# Patient Record
Sex: FEMALE | Race: WHITE | NOT HISPANIC OR LATINO | Employment: UNEMPLOYED | URBAN - METROPOLITAN AREA
[De-identification: names, ages, dates, MRNs, and addresses within clinical notes are randomized per-mention and may not be internally consistent; named-entity substitution may affect disease eponyms.]

---

## 2018-05-14 ENCOUNTER — OFFICE VISIT (OUTPATIENT)
Dept: OBSTETRICS AND GYNECOLOGY | Facility: CLINIC | Age: 33
End: 2018-05-14
Payer: COMMERCIAL

## 2018-05-14 VITALS
SYSTOLIC BLOOD PRESSURE: 124 MMHG | BODY MASS INDEX: 24.69 KG/M2 | DIASTOLIC BLOOD PRESSURE: 84 MMHG | WEIGHT: 134.19 LBS | HEIGHT: 62 IN

## 2018-05-14 DIAGNOSIS — N89.8 VAGINAL DISCHARGE: ICD-10-CM

## 2018-05-14 DIAGNOSIS — Z32.01 POSITIVE URINE PREGNANCY TEST: Primary | ICD-10-CM

## 2018-05-14 PROCEDURE — 80307 DRUG TEST PRSMV CHEM ANLYZR: CPT

## 2018-05-14 PROCEDURE — 99999 PR PBB SHADOW E&M-EST. PATIENT-LVL III: CPT | Mod: PBBFAC,,, | Performed by: OBSTETRICS & GYNECOLOGY

## 2018-05-14 PROCEDURE — 87480 CANDIDA DNA DIR PROBE: CPT

## 2018-05-14 PROCEDURE — 87086 URINE CULTURE/COLONY COUNT: CPT

## 2018-05-14 PROCEDURE — 88175 CYTOPATH C/V AUTO FLUID REDO: CPT

## 2018-05-14 PROCEDURE — 87491 CHLMYD TRACH DNA AMP PROBE: CPT

## 2018-05-14 PROCEDURE — 99395 PREV VISIT EST AGE 18-39: CPT | Mod: S$GLB,,, | Performed by: OBSTETRICS & GYNECOLOGY

## 2018-05-14 PROCEDURE — 87510 GARDNER VAG DNA DIR PROBE: CPT

## 2018-05-14 NOTE — PROGRESS NOTES
"CC: Annual check-up    SUBJECTIVE:   33 y.o. female   for annual routine Pap and checkup. Patient's last menstrual period was 2018 (exact date)..EGA is 4 5/7 wks and edc 19  She has no unusual complaints.     IS 6' 9" AND WAS A 10 + LD BABY    History reviewed. No pertinent past medical history.  History reviewed. No pertinent surgical history.  Social History     Social History    Marital status:      Spouse name: N/A    Number of children: N/A    Years of education: N/A     Occupational History    Not on file.     Social History Main Topics    Smoking status: Never Smoker    Smokeless tobacco: Never Used    Alcohol use No    Drug use: No    Sexual activity: Yes     Partners: Male     Birth control/ protection: None     Other Topics Concern    Not on file     Social History Narrative    No narrative on file     Family History   Problem Relation Age of Onset    Breast cancer Paternal Aunt      OB History    Para Term  AB Living   1 0 0 0 0 0   SAB TAB Ectopic Multiple Live Births   0 0 0 0        # Outcome Date GA Lbr Asif/2nd Weight Sex Delivery Anes PTL Lv   1 Current                     No current outpatient prescriptions on file.     No current facility-administered medications for this visit.      Allergies: Shellfish containing products and Iodine and iodide containing products     ROS:  Constitutional: no weight loss, weight gain, fever, fatigue  Eyes:  No vision changes, glasses/contacts  ENT/Mouth: No ulcers, sinus problems, ears ringing, headache  Cardiovascular: No inability to lie flat, chest pain, exercise intolerance, swelling, heart palpitations  Respiratory: No wheezing, coughing blood, shortness of breath, or cough  Gastrointestinal: No diarrhea, bloody stool, nausea/vomiting, constipation, gas, hemorrhoids  Genitourinary: No blood in urine, painful urination, urgency of urination, frequency of urination, incomplete emptying, incontinence, " "abnormal bleeding, painful periods, heavy periods, vaginal discharge, vaginal odor, painful intercourse, sexual problems, bleeding after intercourse.  Musculoskeletal: No muscle weakness  Skin/Breast: No painful breasts, nipple discharge, masses, rash, ulcers  Neurological: No passing out, seizures, numbness, headache  Endocrine: No diabetes, hypothyroid, hyperthyroid, hot flashes, hair loss, abnormal hair growth, ance  Psychiatric: No depression, crying  Hematologic: No bruises, bleeding, swollen lymph nodes, anemia.      OBJECTIVE:   The patient appears well, alert, oriented x 3, in no distress.  /84   Ht 5' 2" (1.575 m)   Wt 60.9 kg (134 lb 3.2 oz)   LMP 04/11/2018 (Exact Date)   BMI 24.55 kg/m²   NECK: no thyromegaly, trachea midline  SKIN: no acne, striae, hirsutism  BREAST EXAM: not examined  ABDOMEN: no hernias, masses, or hepatosplenomegaly  GENITALIA: normal external genitalia, no erythema, no discharge  URETHRA: normal urethra, normal urethral meatus  VAGINA: vaginal discharge scant and white  CERVIX: no lesions or cervical motion tenderness  UTERUS: enlarged, 4-5 weeks size, CONTRACTED PELVIS  ADNEXA: normal adnexa      ASSESSMENT:   well woman  1. Positive urine pregnancy test    2. Vaginal discharge        PLAN:   pap smear  return after u/s  Orders Placed This Encounter    C. trachomatis/N. gonorrhoeae by AMP DNA Cervix    Urine culture    Vaginosis Screen by DNA Probe    Drug screen panel, emergency    Liquid-based pap smear, screening     "

## 2018-05-14 NOTE — PROGRESS NOTES
"CC: Annual check-up, upt +  SUBJECTIVE:   33 y.o. female   for annual routine Pap and checkup. Patient's last menstrual period was 2018 (exact date)..  She {gen pregnancy complaintsobgyn:495525::"has no unusual complaints"}.        History reviewed. No pertinent past medical history.  History reviewed. No pertinent surgical history.  Social History     Social History    Marital status:      Spouse name: N/A    Number of children: N/A    Years of education: N/A     Occupational History    Not on file.     Social History Main Topics    Smoking status: Never Smoker    Smokeless tobacco: Never Used    Alcohol use No    Drug use: No    Sexual activity: Yes     Partners: Male     Birth control/ protection: None     Other Topics Concern    Not on file     Social History Narrative    No narrative on file     Family History   Problem Relation Age of Onset    Breast cancer Paternal Aunt      OB History    Para Term  AB Living   1 0 0 0 0 0   SAB TAB Ectopic Multiple Live Births   0 0 0 0        # Outcome Date GA Lbr Asif/2nd Weight Sex Delivery Anes PTL Lv   1 Current                     No current outpatient prescriptions on file.     No current facility-administered medications for this visit.      Allergies: Shellfish containing products and Iodine and iodide containing products     ROS:  Constitutional: no weight loss, weight gain, fever, fatigue  Eyes:  No vision changes, glasses/contacts  ENT/Mouth: No ulcers, sinus problems, ears ringing, headache  Cardiovascular: No inability to lie flat, chest pain, exercise intolerance, swelling, heart palpitations  Respiratory: No wheezing, coughing blood, shortness of breath, or cough  Gastrointestinal: No diarrhea, bloody stool, nausea/vomiting, constipation, gas, hemorrhoids  Genitourinary: No blood in urine, painful urination, urgency of urination, frequency of urination, incomplete emptying, incontinence, abnormal bleeding, painful " "periods, heavy periods, vaginal discharge, vaginal odor, painful intercourse, sexual problems, bleeding after intercourse.  Musculoskeletal: No muscle weakness  Skin/Breast: No painful breasts, nipple discharge, masses, rash, ulcers  Neurological: No passing out, seizures, numbness, headache  Endocrine: No diabetes, hypothyroid, hyperthyroid, hot flashes, hair loss, abnormal hair growth, ance  Psychiatric: No depression, crying  Hematologic: No bruises, bleeding, swollen lymph nodes, anemia.      OBJECTIVE:   The patient appears well, alert, oriented x 3, in no distress.  /84   Ht 5' 2" (1.575 m)   Wt 60.9 kg (134 lb 3.2 oz)   LMP 04/11/2018 (Exact Date)   BMI 24.55 kg/m²   NECK: {NECK EXAM:85235::"no thyromegaly, trachea midline"}  SKIN: {Skin exam (ob):32709::"no acne, striae, hirsutism"}  BREAST EXAM: {pe breast exam:387208::"breasts appear normal, no suspicious masses, no skin or nipple changes or axillary nodes"}  ABDOMEN: {Exam; abdomen:11553::"no hernias, masses, or hepatosplenomegaly"}  GENITALIA: {Exam; genitalia female:07554::"normal external genitalia, no erythema, no discharge"}  URETHRA: {urethra:028869::"normal urethra, normal urethral meatus"}  VAGINA: {Exam; female vaginal exam:35835}  CERVIX: {pe cervix ob comprehensive:525870::"no lesions or cervical motion tenderness"}  UTERUS: {exam; uterus:92383}  ADNEXA: {exam; adnexa:08977}      ASSESSMENT:   {gyn assessment:915570::"well woman"}  No diagnosis found.    PLAN:   {gyn plan:478486::"mammogram","pap smear","return annually or prn"}     "

## 2018-05-15 LAB
AMPHET+METHAMPHET UR QL: NEGATIVE
BARBITURATES UR QL SCN>200 NG/ML: NEGATIVE
BENZODIAZ UR QL SCN>200 NG/ML: NEGATIVE
BZE UR QL SCN: NEGATIVE
C TRACH DNA SPEC QL NAA+PROBE: NOT DETECTED
CANDIDA RRNA VAG QL PROBE: NEGATIVE
CANNABINOIDS UR QL SCN: NEGATIVE
CREAT UR-MCNC: 15 MG/DL
G VAGINALIS RRNA GENITAL QL PROBE: NEGATIVE
METHADONE UR QL SCN>300 NG/ML: NEGATIVE
N GONORRHOEA DNA SPEC QL NAA+PROBE: NOT DETECTED
OPIATES UR QL SCN: NEGATIVE
PCP UR QL SCN>25 NG/ML: NEGATIVE
T VAGINALIS RRNA GENITAL QL PROBE: NEGATIVE
TOXICOLOGY INFORMATION: NORMAL

## 2018-05-16 LAB — BACTERIA UR CULT: NO GROWTH

## 2018-05-28 ENCOUNTER — HOSPITAL ENCOUNTER (OUTPATIENT)
Dept: RADIOLOGY | Facility: HOSPITAL | Age: 33
Discharge: HOME | End: 2018-05-28
Attending: OBSTETRICS & GYNECOLOGY
Payer: COMMERCIAL

## 2018-05-28 ENCOUNTER — ROUTINE PRENATAL (OUTPATIENT)
Dept: OBSTETRICS AND GYNECOLOGY | Facility: CLINIC | Age: 33
End: 2018-05-28
Payer: COMMERCIAL

## 2018-05-28 VITALS — WEIGHT: 136 LBS | BODY MASS INDEX: 24.87 KG/M2 | DIASTOLIC BLOOD PRESSURE: 70 MMHG | SYSTOLIC BLOOD PRESSURE: 100 MMHG

## 2018-05-28 DIAGNOSIS — Z3A.01 6 WEEKS GESTATION OF PREGNANCY: Primary | ICD-10-CM

## 2018-05-28 DIAGNOSIS — Z32.01 POSITIVE URINE PREGNANCY TEST: ICD-10-CM

## 2018-05-28 PROCEDURE — 0502F SUBSEQUENT PRENATAL CARE: CPT | Mod: S$GLB,,, | Performed by: OBSTETRICS & GYNECOLOGY

## 2018-05-28 PROCEDURE — 76801 OB US < 14 WKS SINGLE FETUS: CPT | Mod: TC,PO

## 2018-05-28 PROCEDURE — 99999 PR PBB SHADOW E&M-EST. PATIENT-LVL II: CPT | Mod: PBBFAC,,, | Performed by: OBSTETRICS & GYNECOLOGY

## 2018-06-25 ENCOUNTER — ROUTINE PRENATAL (OUTPATIENT)
Dept: OBSTETRICS AND GYNECOLOGY | Facility: CLINIC | Age: 33
End: 2018-06-25
Payer: COMMERCIAL

## 2018-06-25 VITALS
DIASTOLIC BLOOD PRESSURE: 72 MMHG | WEIGHT: 142.81 LBS | SYSTOLIC BLOOD PRESSURE: 118 MMHG | BODY MASS INDEX: 26.12 KG/M2

## 2018-06-25 DIAGNOSIS — Z3A.10 10 WEEKS GESTATION OF PREGNANCY: Primary | ICD-10-CM

## 2018-06-25 PROCEDURE — 0502F SUBSEQUENT PRENATAL CARE: CPT | Mod: S$GLB,,, | Performed by: OBSTETRICS & GYNECOLOGY

## 2018-06-25 PROCEDURE — 99999 PR PBB SHADOW E&M-EST. PATIENT-LVL II: CPT | Mod: PBBFAC,,, | Performed by: OBSTETRICS & GYNECOLOGY

## 2018-06-25 RX ORDER — FOLIC ACID 0.8 MG
800 TABLET ORAL DAILY
COMMUNITY

## 2018-07-23 ENCOUNTER — ROUTINE PRENATAL (OUTPATIENT)
Dept: OBSTETRICS AND GYNECOLOGY | Facility: CLINIC | Age: 33
End: 2018-07-23
Payer: COMMERCIAL

## 2018-07-23 VITALS
WEIGHT: 148.81 LBS | DIASTOLIC BLOOD PRESSURE: 78 MMHG | SYSTOLIC BLOOD PRESSURE: 122 MMHG | BODY MASS INDEX: 27.22 KG/M2

## 2018-07-23 DIAGNOSIS — Z3A.14 14 WEEKS GESTATION OF PREGNANCY: Primary | ICD-10-CM

## 2018-07-23 PROCEDURE — 99999 PR PBB SHADOW E&M-EST. PATIENT-LVL II: CPT | Mod: PBBFAC,,, | Performed by: OBSTETRICS & GYNECOLOGY

## 2018-07-23 PROCEDURE — 0502F SUBSEQUENT PRENATAL CARE: CPT | Mod: S$GLB,,, | Performed by: OBSTETRICS & GYNECOLOGY

## 2018-07-27 ENCOUNTER — LAB VISIT (OUTPATIENT)
Dept: LAB | Facility: HOSPITAL | Age: 33
End: 2018-07-27
Attending: OBSTETRICS & GYNECOLOGY
Payer: COMMERCIAL

## 2018-07-27 DIAGNOSIS — Z3A.14 14 WEEKS GESTATION OF PREGNANCY: ICD-10-CM

## 2018-07-27 PROCEDURE — 36415 COLL VENOUS BLD VENIPUNCTURE: CPT | Mod: PO

## 2018-07-27 PROCEDURE — 82105 ALPHA-FETOPROTEIN SERUM: CPT | Mod: PO

## 2018-07-30 LAB
AFP INTERPRETATION: NORMAL
AFP MATERNAL: NEGATIVE
ALPHA FETOPROTEIN MATERNAL: 28.9 NG/ML
ETHNIC ORIGIN: NORMAL
GESTATIONAL AGE (DAYS): 3
GESTATIONAL AGE (WEEKS): 15
GESTATIONAL AGE METHOD: NORMAL
INSULIN DEPEND. DIABETES: NORMAL
M.O.M. ALPHA FETOPROTEIN: 1
MATERNAL AGE AT EDD (YRS): 31
MATERNAL WEIGHT (LBS): 149
MULTIPLE GESTATION: NORMAL
SMOKING STATUS FTND: NO

## 2018-08-15 ENCOUNTER — TELEPHONE (OUTPATIENT)
Dept: OBSTETRICS AND GYNECOLOGY | Facility: CLINIC | Age: 33
End: 2018-08-15

## 2018-08-15 NOTE — TELEPHONE ENCOUNTER
Returned patients call.   Notified of restrictions for OB dental procedures. Verbalized understanding

## 2018-08-15 NOTE — TELEPHONE ENCOUNTER
----- Message from Tamra Ocampo sent at 8/15/2018  9:49 AM CDT -----  Contact: April W/ Dr. Constantino -[phone]  April is calling because the patient is in her 2nd trimester and is waiting to get a filling. Please call and advise.

## 2018-08-15 NOTE — TELEPHONE ENCOUNTER
----- Message from Anthony Zamarripa sent at 8/15/2018  8:52 AM CDT -----  Contact: baldomero from [facility] dental office [phone]  Dental office need to know if the patient can get a filling. Patient is at the office waiting. Please call and advise.

## 2018-08-29 ENCOUNTER — HOSPITAL ENCOUNTER (OUTPATIENT)
Dept: RADIOLOGY | Facility: HOSPITAL | Age: 33
Discharge: HOME | End: 2018-08-29
Attending: OBSTETRICS & GYNECOLOGY
Payer: COMMERCIAL

## 2018-08-29 ENCOUNTER — ROUTINE PRENATAL (OUTPATIENT)
Dept: OBSTETRICS AND GYNECOLOGY | Facility: CLINIC | Age: 33
End: 2018-08-29
Payer: COMMERCIAL

## 2018-08-29 VITALS
BODY MASS INDEX: 28.02 KG/M2 | SYSTOLIC BLOOD PRESSURE: 115 MMHG | DIASTOLIC BLOOD PRESSURE: 71 MMHG | WEIGHT: 153.19 LBS

## 2018-08-29 DIAGNOSIS — Z3A.20 20 WEEKS GESTATION OF PREGNANCY: Primary | ICD-10-CM

## 2018-08-29 DIAGNOSIS — Z3A.14 14 WEEKS GESTATION OF PREGNANCY: ICD-10-CM

## 2018-08-29 PROCEDURE — 0502F SUBSEQUENT PRENATAL CARE: CPT | Mod: S$GLB,,, | Performed by: OBSTETRICS & GYNECOLOGY

## 2018-08-29 PROCEDURE — 76805 OB US >/= 14 WKS SNGL FETUS: CPT | Mod: TC,PO

## 2018-08-29 PROCEDURE — 99999 PR PBB SHADOW E&M-EST. PATIENT-LVL II: CPT | Mod: PBBFAC,,, | Performed by: OBSTETRICS & GYNECOLOGY

## 2018-09-28 ENCOUNTER — LAB VISIT (OUTPATIENT)
Dept: LAB | Facility: HOSPITAL | Age: 33
End: 2018-09-28
Attending: OBSTETRICS & GYNECOLOGY
Payer: COMMERCIAL

## 2018-09-28 ENCOUNTER — ROUTINE PRENATAL (OUTPATIENT)
Dept: OBSTETRICS AND GYNECOLOGY | Facility: CLINIC | Age: 33
End: 2018-09-28
Payer: COMMERCIAL

## 2018-09-28 VITALS
DIASTOLIC BLOOD PRESSURE: 70 MMHG | SYSTOLIC BLOOD PRESSURE: 126 MMHG | BODY MASS INDEX: 30.43 KG/M2 | WEIGHT: 166.38 LBS

## 2018-09-28 DIAGNOSIS — Z3A.24 24 WEEKS GESTATION OF PREGNANCY: Primary | ICD-10-CM

## 2018-09-28 DIAGNOSIS — Z3A.20 20 WEEKS GESTATION OF PREGNANCY: ICD-10-CM

## 2018-09-28 LAB — GLUCOSE SERPL-MCNC: 94 MG/DL

## 2018-09-28 PROCEDURE — 0502F SUBSEQUENT PRENATAL CARE: CPT | Mod: S$GLB,,, | Performed by: OBSTETRICS & GYNECOLOGY

## 2018-09-28 PROCEDURE — 36415 COLL VENOUS BLD VENIPUNCTURE: CPT | Mod: PO

## 2018-09-28 PROCEDURE — 99999 PR PBB SHADOW E&M-EST. PATIENT-LVL II: CPT | Mod: PBBFAC,,, | Performed by: OBSTETRICS & GYNECOLOGY

## 2018-09-28 PROCEDURE — 82950 GLUCOSE TEST: CPT | Mod: PO

## 2018-09-28 NOTE — PROGRESS NOTES
Doing well, did dm screen today early, if # borderline will rpt at 28 wks  fht's 130-40's  rtc 4 wks

## 2018-10-24 ENCOUNTER — ROUTINE PRENATAL (OUTPATIENT)
Dept: OBSTETRICS AND GYNECOLOGY | Facility: CLINIC | Age: 33
End: 2018-10-24
Payer: COMMERCIAL

## 2018-10-24 VITALS
WEIGHT: 171.81 LBS | BODY MASS INDEX: 31.42 KG/M2 | SYSTOLIC BLOOD PRESSURE: 118 MMHG | DIASTOLIC BLOOD PRESSURE: 80 MMHG

## 2018-10-24 DIAGNOSIS — Z34.90 PREGNANCY, UNSPECIFIED GESTATIONAL AGE: Primary | ICD-10-CM

## 2018-10-24 PROCEDURE — 99999 PR PBB SHADOW E&M-EST. PATIENT-LVL II: CPT | Mod: PBBFAC,,, | Performed by: OBSTETRICS & GYNECOLOGY

## 2018-10-24 PROCEDURE — 90686 IIV4 VACC NO PRSV 0.5 ML IM: CPT | Mod: S$GLB,,, | Performed by: OBSTETRICS & GYNECOLOGY

## 2018-10-24 PROCEDURE — 0502F SUBSEQUENT PRENATAL CARE: CPT | Mod: S$GLB,,, | Performed by: OBSTETRICS & GYNECOLOGY

## 2018-10-24 PROCEDURE — 90471 IMMUNIZATION ADMIN: CPT | Mod: S$GLB,,, | Performed by: OBSTETRICS & GYNECOLOGY

## 2018-10-29 ENCOUNTER — LAB VISIT (OUTPATIENT)
Dept: LAB | Facility: HOSPITAL | Age: 33
End: 2018-10-29
Attending: OBSTETRICS & GYNECOLOGY
Payer: COMMERCIAL

## 2018-10-29 DIAGNOSIS — Z34.90 PREGNANCY, UNSPECIFIED GESTATIONAL AGE: ICD-10-CM

## 2018-10-29 LAB — GLUCOSE SERPL-MCNC: 79 MG/DL

## 2018-10-29 PROCEDURE — 36415 COLL VENOUS BLD VENIPUNCTURE: CPT | Mod: PO

## 2018-10-29 PROCEDURE — 82950 GLUCOSE TEST: CPT | Mod: PO

## 2018-11-21 ENCOUNTER — ROUTINE PRENATAL (OUTPATIENT)
Dept: OBSTETRICS AND GYNECOLOGY | Facility: CLINIC | Age: 33
End: 2018-11-21
Payer: COMMERCIAL

## 2018-11-21 VITALS
WEIGHT: 180.63 LBS | BODY MASS INDEX: 33.03 KG/M2 | SYSTOLIC BLOOD PRESSURE: 136 MMHG | DIASTOLIC BLOOD PRESSURE: 84 MMHG

## 2018-11-21 DIAGNOSIS — Z3A.32 32 WEEKS GESTATION OF PREGNANCY: Primary | ICD-10-CM

## 2018-11-21 PROCEDURE — 99999 PR PBB SHADOW E&M-EST. PATIENT-LVL II: CPT | Mod: PBBFAC,,, | Performed by: OBSTETRICS & GYNECOLOGY

## 2018-11-21 PROCEDURE — 0502F SUBSEQUENT PRENATAL CARE: CPT | Mod: S$GLB,,, | Performed by: OBSTETRICS & GYNECOLOGY

## 2018-12-05 ENCOUNTER — ROUTINE PRENATAL (OUTPATIENT)
Dept: OBSTETRICS AND GYNECOLOGY | Facility: CLINIC | Age: 33
End: 2018-12-05
Payer: COMMERCIAL

## 2018-12-05 VITALS
DIASTOLIC BLOOD PRESSURE: 84 MMHG | BODY MASS INDEX: 34.09 KG/M2 | WEIGHT: 186.38 LBS | SYSTOLIC BLOOD PRESSURE: 130 MMHG

## 2018-12-05 PROCEDURE — 99999 PR PBB SHADOW E&M-EST. PATIENT-LVL II: CPT | Mod: PBBFAC,,, | Performed by: OBSTETRICS & GYNECOLOGY

## 2018-12-05 PROCEDURE — 0502F SUBSEQUENT PRENATAL CARE: CPT | Mod: S$GLB,,, | Performed by: OBSTETRICS & GYNECOLOGY

## 2018-12-05 NOTE — PROGRESS NOTES
Doing well  fht's persisitently in 170's  Baby very active today  Will get mfm appt  Check fht's with home doppler, if stays elevated call and can schedule mfm sooner  rtc 1 wk

## 2018-12-06 ENCOUNTER — OFFICE VISIT (OUTPATIENT)
Dept: PEDIATRIC CARDIOLOGY | Facility: CLINIC | Age: 33
End: 2018-12-06
Attending: PEDIATRICS
Payer: COMMERCIAL

## 2018-12-06 ENCOUNTER — TELEPHONE (OUTPATIENT)
Dept: OBSTETRICS AND GYNECOLOGY | Facility: CLINIC | Age: 33
End: 2018-12-06

## 2018-12-06 ENCOUNTER — INITIAL CONSULT (OUTPATIENT)
Dept: MATERNAL FETAL MEDICINE | Facility: CLINIC | Age: 33
End: 2018-12-06
Payer: COMMERCIAL

## 2018-12-06 ENCOUNTER — HOSPITAL ENCOUNTER (OUTPATIENT)
Dept: PERINATAL CARE | Facility: OTHER | Age: 33
Discharge: HOME | End: 2018-12-06
Attending: OBSTETRICS & GYNECOLOGY
Payer: COMMERCIAL

## 2018-12-06 ENCOUNTER — PROCEDURE VISIT (OUTPATIENT)
Dept: MATERNAL FETAL MEDICINE | Facility: CLINIC | Age: 33
End: 2018-12-06
Payer: COMMERCIAL

## 2018-12-06 VITALS
WEIGHT: 188.69 LBS | SYSTOLIC BLOOD PRESSURE: 130 MMHG | BODY MASS INDEX: 34.52 KG/M2 | DIASTOLIC BLOOD PRESSURE: 80 MMHG

## 2018-12-06 VITALS
HEIGHT: 62 IN | DIASTOLIC BLOOD PRESSURE: 80 MMHG | BODY MASS INDEX: 34.72 KG/M2 | WEIGHT: 188.69 LBS | HEART RATE: 76 BPM | SYSTOLIC BLOOD PRESSURE: 130 MMHG

## 2018-12-06 DIAGNOSIS — O36.8390 ANTEPARTUM FETAL TACHYCARDIA AFFECTING CARE OF MOTHER: Primary | ICD-10-CM

## 2018-12-06 DIAGNOSIS — Z03.73 SUSPECTED FETAL ANOMALY NOT FOUND: Primary | ICD-10-CM

## 2018-12-06 DIAGNOSIS — O35.9XX0 FETAL ABNORMALITY AFFECTING MANAGEMENT OF MOTHER, SINGLE OR UNSPECIFIED FETUS: ICD-10-CM

## 2018-12-06 DIAGNOSIS — O36.8390 FETAL TACHYCARDIA AFFECTING MANAGEMENT OF MOTHER: ICD-10-CM

## 2018-12-06 DIAGNOSIS — O36.8390 ANTEPARTUM FETAL TACHYCARDIA AFFECTING CARE OF MOTHER: ICD-10-CM

## 2018-12-06 PROCEDURE — 76827 ECHO EXAM OF FETAL HEART: CPT | Mod: S$GLB,,, | Performed by: PEDIATRICS

## 2018-12-06 PROCEDURE — 3008F BODY MASS INDEX DOCD: CPT | Mod: CPTII,S$GLB,, | Performed by: PEDIATRICS

## 2018-12-06 PROCEDURE — 59025 FETAL NON-STRESS TEST: CPT | Mod: 26,,, | Performed by: OBSTETRICS & GYNECOLOGY

## 2018-12-06 PROCEDURE — 99999 PR PBB SHADOW E&M-EST. PATIENT-LVL III: CPT | Mod: PBBFAC,,, | Performed by: PEDIATRICS

## 2018-12-06 PROCEDURE — 99244 OFF/OP CNSLTJ NEW/EST MOD 40: CPT | Mod: 25,S$GLB,, | Performed by: OBSTETRICS & GYNECOLOGY

## 2018-12-06 PROCEDURE — 93325 DOPPLER ECHO COLOR FLOW MAPG: CPT | Mod: S$GLB,,, | Performed by: PEDIATRICS

## 2018-12-06 PROCEDURE — 99203 OFFICE O/P NEW LOW 30 MIN: CPT | Mod: 25,S$GLB,, | Performed by: PEDIATRICS

## 2018-12-06 PROCEDURE — 76819 FETAL BIOPHYS PROFIL W/O NST: CPT | Mod: S$GLB,,, | Performed by: OBSTETRICS & GYNECOLOGY

## 2018-12-06 PROCEDURE — 59025 FETAL NON-STRESS TEST: CPT

## 2018-12-06 PROCEDURE — 76825 ECHO EXAM OF FETAL HEART: CPT | Mod: S$GLB,,, | Performed by: PEDIATRICS

## 2018-12-06 PROCEDURE — 99999 PR PBB SHADOW E&M-EST. PATIENT-LVL II: CPT | Mod: PBBFAC,,, | Performed by: OBSTETRICS & GYNECOLOGY

## 2018-12-06 PROCEDURE — 76811 OB US DETAILED SNGL FETUS: CPT | Mod: S$GLB,,, | Performed by: OBSTETRICS & GYNECOLOGY

## 2018-12-06 NOTE — LETTER
December 7, 2018      Jorge L Zavala MD  [address]  [address]  [place] [address]           [facility]  [address] [facility] [place] [address]  Phone: [phone]          Patient: Michael Ashley   MR Number: [id number]   YOB: 1985   Date of Visit: 12/6/2018       Dear Dr. Jorge L Zavala:    Thank you for referring Michael Ashley to me for evaluation. Attached you will find relevant portions of my assessment and plan of care.    If you have questions, please do not hesitate to call me. I look forward to following Michael Ashley along with you.    Sincerely,    Meghana Davison MD    Enclosure  CC:  No Recipients    If you would like to receive this communication electronically, please contact [email or web address] or [phone] to request more information on [employer] Link access.    For providers and/or their staff who would like to refer a patient to [facility], please contact us through our one-stop-shop provider referral line, [facility], at [phone].    If you feel you have received this communication in error or would no longer like to receive these types of communications, please e-mail [email or web address]

## 2018-12-06 NOTE — TELEPHONE ENCOUNTER
Called pt. Pt states wants to go see MFM on 12/13/18. Informed pt we will call her back and reschedule. Pt verbalized understanding.

## 2018-12-06 NOTE — TELEPHONE ENCOUNTER
----- Message from Poonam Kidd sent at 12/5/2018  5:14 PM CST -----  Contact: [phone]/ hkyj   Patient requesting to speak with you regarding 12/6 visit with fetal medicine.  Please advise.

## 2018-12-07 PROBLEM — Z03.73 SUSPECTED FETAL ANOMALY NOT FOUND: Status: ACTIVE | Noted: 2018-12-07

## 2018-12-07 NOTE — PROGRESS NOTES
"Indication  ========    Fetal anatomy survey: . Fetal tachycardia.    Method  ======    [place] E10, Transabdominal ultrasound examination. View: Suboptimal view: limited by late gestational age.    Pregnancy  =========    Hope pregnancy. Number of fetuses: 1.    Dating  ======    Cycle: regular cycle  GA by "stated dating" 34 w + 2 d  PORTER by "stated dating": 1/15/2019  Ultrasound examination on: 12/6/2018  GA by U/S based upon: AC, BPD, Femur, HC  GA by U/S 38 w + 1 d  PORTER by U/S: 12/19/2018  Assigned: Dating performed on 12/6/2018, based on the external assessment  Assigned GA 34 w + 2 d  Assigned PORTER: 1/15/2019    General Evaluation  ==============    Cardiac activity: present.  bpm.  Fetal movements: visualized.  Presentation: cephalic.  Placenta: anterior.  Umbilical cord: 3 vessel cord, normal.  Amniotic fluid: Amount of AF: normal amount. MVP 5.3 cm.  Fetal heart rate tachycardia 196-145bpm .    Biophysical Profile  ==============    2: Fetal breathing movements  2: Gross body movements  2: Fetal tone  2: Amniotic fluid volume  8/8: Biophysical profile score    Fetal Biometry  ============    Fetal Biometry  BPD 95.3 mm 38w 6d Hadlock  .4 mm  .6 mm 41w 2d Hadlock  .1 mm 37w 6d Hadlock  Femur 67.4 mm 34w 5d Hadlock  EFW 3,263 g 81% Jeffery  Calculated by: Hadlock (BPD-HC-AC-FL)  EFW (lb) 7 lb  EFW (oz) 3 oz  Cephalic index 0.75  HC / AC 1.04  FL / BPD 0.71  FL / AC 0.20  MVP 5.3 cm   bpm  Head / Face / Neck   4.8 mm    Fetal Anatomy  ===========    Cranium: normal  Lateral ventricles: normal  Choroid plexus: normal  Midline falx: normal  Cavum septi pellucidi: normal  Cerebellum: suboptimal  Cisterna magna: suboptimal  Head shape: normal  Rt lateral ventricle: normal  Lt lateral ventricle: normal  Rt choroid plexus: normal  Lt choroid plexus: normal  Posterior fossa: suboptimal  Lips: normal  Profile: normal  Nose: normal  4-chamber " "view: normal  RVOT: suboptimal  LVOT: normal  Heart / Thorax: Septal views: normal  Situs: normal  Aortic arch: normal  Ductal arch: suboptimal  SVC: suboptimal  IVC: suboptimal  3-vessel view: normal  3-vessel-trachea view: normal  Cardiac position: normal  Cardiac axis: normal  Cardiac size: normal  Cardiac rhythm: normal  Rt lung: normal  Lt lung: normal  Diaphragm: normal  Cord insertion: suboptimal  Stomach: normal  Kidneys: normal  Bladder: normal  Genitals: normal  Abdom. wall: suboptimal  Abdom. cavity: normal  Rt kidney: normal  Lt kidney: normal  Liver: normal  Bowel: normal  Cervical spine: suboptimal  Thoracic spine: suboptimal  Lumbar spine: suboptimal  Sacral spine: suboptimal  Arms: normal  Legs: normal  Rt arm: normal  Lt arm: normal  Rt hand: present  Lt hand: present  Rt leg: normal  Lt leg: normal  Rt foot: normal  Lt foot: normal  Position of hands: normal  Position of feet: normal  Gender: male  Wants to know gender: yes    Consultation  ==========    Seen in consultation for ? Fetal tachycardia  85.6 kg  130/80  G1  Folic acid  No PMH  [place] teeth  SH: Neg  FH: neg  OB: AFP neg  G1  Father of baby was large 11#; mother in law 12#  Father of baby 6'9"  We discussed the tachycardia noted during the examination. She denies any recent stimulant use. Recommend avoidance of caffeine and  stimulants. No recent illnesses or fevers. No abdominal tenderness. Suspect is periods of acceleration during fetal activity. NST confirms  normal baseline FHR. Echocardiogram performed to rule out structural heart disease per pediatric cardiology.  We discussed the fetal growth profile. The fetus is large for gestational age. This may be normal genetic variation given the father of the baby's  stature. Her diabetes screen was negative. However, I do recommend that she obtain pattern blood sugars for a week. I will message her  primary OB so that this can be arranged. Additionally, M will repeat the fetal growth " examination in 3 weeks. We briefly discussed the  potential for an overgrowth syndrome; however no other structural abnormalities are noted today. We discussed obtaining a fasting blood sugar  and 2 hour postprandial values for a week. Primary  is recommended if the EFW is > 5000 gm at term or > 4500 gm with diabetes.  I spent 60 minutes on the consultation today with the patient and her mother regarding findings from today's ultrasound exam. More than 50%  of the consultation was spent in face-to-face counseling and coordination of care.    Impression  =========    Hope, living IUP.  Large for gestational growth pattern. EFW plots at 81st percentile but AC and HC > 99th percentile.  Normal AFV.  FHR ranged from 140s-190s.  BPP 8/8.  No structural heart defects are noted but examination is incomplete.  No pericardial effusion or hydrops noted.  Placenta is anterior and not previa.    Recommendation  ==============    NST to follow in prenatal testing center (was reactive, baseline 150 with accelerations to 180s).  Fetal echocardiogram to follow (preliminary report is normal).  Continue routine prenatal care with primary OB.  MFM will repeat fetal growth and wellbeing assessment on .  Recommend primary OB order a glucometer, test strips, and lancets for patient to obtain pattern blood sugars (Fasting and 2 hour  postprandial).

## 2018-12-07 NOTE — PROGRESS NOTES
[facility] Fetal Cardiology Clinic    Today, I had the pleasure of evaluating Michael Ashley who is now 33 y.o. and carrying her first pregnancy at 34 2/7 weeks gestation with an PORTER of 1/15/19. She was referred for evaluation of the fetal heart due fetal tachycardia. Pregnancy has been so far uncomplicated. Yesterday, at a routine evaluation the fetal heart rate was noted to be 170 bpm. She was referred to [facility] who noted a normal BPP and good heart rate variability with noted acceleration with fetal activity and no concerning findings on the anatomy scan. Mom drinks one cup of coffee a day but does classify herself as the anxious sort.      She is carrying a male fetus, named Joaine.  She has felt the baby move.       Obstetric History:    .  Her OB history is otherwise unremarkable.     Past Medical History:   Diagnosis Date    Patient denies medical problems          Current Outpatient Medications:     folic acid (FOLVITE) 800 MCG Tab, Take 800 mcg by mouth once daily., Disp: , Rfl:      Review of patient's allergies indicates:   Allergen Reactions    Shellfish containing products     Iodine and iodide containing products        Family History: Negative for congenital heart disease, early coronary artery disease, sudden unexplained death, connective tissues disorders, genetic syndromes, multiple miscarriages or other congenital anomalies.    Social History: Ms. Michael Ashley is  to the father of the baby/single.  The father of the baby is/is not involved.     FETAL ECHOCARDIOGRAM (summary):  The study was technically difficult with many images being suboptimal in quality secondary to fetal movement and advanced gestation.  1. The heart rate = 140-150 bpm. No ectopy demonstrated. The mechanical IA interval is normal.  2. Normal segmental anatomy with atrioventricular and ventriculoarterial concordance.  3. No evidece of valvular dysfunction.  4. Normal ductal arch, the limited  aortic arch imaging did not demonstrate any abnormalities.  5. Normal bientricular size and systolic function.  6. There is no pericardial effusion.  (Full report in electronic medical record)    Impression:  Single active male fetus at 34 2/7 wga.  Normal fetal echocardiogram.      Todays fetal echocardiogram is normal, within the limitations of fetal echocardiography.  I discussed with her that fetal echocardiography is insufficiently sensitive to rule out all septal defects, anomalies of pulmonary and systemic veins, arch anomalies, and some valvar abnormalities, nor can it ensure that the ductus arteriosus and foramen ovale will spontaneously close.     The exam today demonstrated a normal fetal heart rate, anatomy and function with no ectopy. I recommended eliminating caffeine entirely throughout the pregnancy.     Recommendations:  Location, timing, and mode of delivery will be determined by the obstetrical team.  She does not require further follow-up in the fetal echocardiography clinic, but I would be happy to see her again if additional questions or concerns arise.    Should there be any concerns about the baby's heart after birth, a post-héctor echocardiogram and cardiology consultation are recommended.           The above information was discussed in detail including the use of diagrams, 30 minutes were used for the evaluation with half of that time in discussion and counseling.

## 2018-12-10 ENCOUNTER — ROUTINE PRENATAL (OUTPATIENT)
Dept: OBSTETRICS AND GYNECOLOGY | Facility: CLINIC | Age: 33
End: 2018-12-10
Payer: COMMERCIAL

## 2018-12-10 ENCOUNTER — TELEPHONE (OUTPATIENT)
Dept: OBSTETRICS AND GYNECOLOGY | Facility: CLINIC | Age: 33
End: 2018-12-10

## 2018-12-10 VITALS — WEIGHT: 192 LBS | SYSTOLIC BLOOD PRESSURE: 124 MMHG | DIASTOLIC BLOOD PRESSURE: 84 MMHG | BODY MASS INDEX: 35.11 KG/M2

## 2018-12-10 DIAGNOSIS — Z3A.35 35 WEEKS GESTATION OF PREGNANCY: Primary | ICD-10-CM

## 2018-12-10 PROBLEM — O36.8390 FETAL TACHYCARDIA AFFECTING MANAGEMENT OF MOTHER: Status: ACTIVE | Noted: 2018-12-10

## 2018-12-10 PROCEDURE — 87081 CULTURE SCREEN ONLY: CPT

## 2018-12-10 PROCEDURE — 99999 PR PBB SHADOW E&M-EST. PATIENT-LVL III: CPT | Mod: PBBFAC,,, | Performed by: OBSTETRICS & GYNECOLOGY

## 2018-12-10 PROCEDURE — 0502F SUBSEQUENT PRENATAL CARE: CPT | Mod: S$GLB,,, | Performed by: OBSTETRICS & GYNECOLOGY

## 2018-12-10 NOTE — TELEPHONE ENCOUNTER
----- Message from Brandee Pineda sent at 12/10/2018  8:46 AM CST -----  No. [phone]    Patient is 35 weeks pregnant.  She called to confirm her appointment today at 1:00.  The appointment is not in the system.   Please call.

## 2018-12-10 NOTE — PROGRESS NOTES
Doing well, anxious about fetal size   fht's 150's  GBBS done  cx closed, narrow ant arch, ok room posteriorly  Discussed IOL, vs elective primary c/s vs spont labor  See MFM on 12/27 and get opinion on scheduling Primary c/s

## 2018-12-13 LAB — BACTERIA SPEC AEROBE CULT: NORMAL

## 2018-12-24 ENCOUNTER — ROUTINE PRENATAL (OUTPATIENT)
Dept: OBSTETRICS AND GYNECOLOGY | Facility: CLINIC | Age: 33
End: 2018-12-24
Payer: COMMERCIAL

## 2018-12-24 VITALS — DIASTOLIC BLOOD PRESSURE: 76 MMHG | BODY MASS INDEX: 36.5 KG/M2 | SYSTOLIC BLOOD PRESSURE: 136 MMHG | WEIGHT: 199.63 LBS

## 2018-12-24 DIAGNOSIS — Z3A.36 36 WEEKS GESTATION OF PREGNANCY: Primary | ICD-10-CM

## 2018-12-24 LAB
CREAT UR-MCNC: 91 MG/DL
PROT UR-MCNC: 19 MG/DL
PROT/CREAT UR: 0.21 MG/G{CREAT}

## 2018-12-24 PROCEDURE — 99999 PR PBB SHADOW E&M-EST. PATIENT-LVL II: CPT | Mod: PBBFAC,,, | Performed by: OBSTETRICS & GYNECOLOGY

## 2018-12-24 PROCEDURE — 0502F SUBSEQUENT PRENATAL CARE: CPT | Mod: S$GLB,,, | Performed by: OBSTETRICS & GYNECOLOGY

## 2018-12-24 PROCEDURE — 84156 ASSAY OF PROTEIN URINE: CPT

## 2018-12-24 NOTE — PROGRESS NOTES
Doing fine. Still has mj LE edema for 3 wks  FHT's 140s~150s on doppler. Urine neg for albumin/glucose.  Os closed.  bp 136/76, no ha/blurry vision  MFM repeat u/s in 3 days.  Advise home bp monitoring, and warn sign for pre-elampsia.  Will RTC 1/4

## 2018-12-25 ENCOUNTER — TELEPHONE (OUTPATIENT)
Dept: OBSTETRICS AND GYNECOLOGY | Facility: HOSPITAL | Age: 33
End: 2018-12-25

## 2018-12-25 NOTE — TELEPHONE ENCOUNTER
Please have pt come in this week for a BP check and see one of the physicians working  Has increased swelling and elevated P/C ration but nml BP in clinic  Needs to be evaluated for Gest HTN

## 2018-12-26 NOTE — TELEPHONE ENCOUNTER
Called pt asking her to go to L&D today prior to 1pm (instructions by ). Pt stated that  had told her that she was ok to go to [facility] tomorrow (no other appt required).

## 2018-12-27 ENCOUNTER — HOSPITAL ENCOUNTER (INPATIENT)
Facility: HOSPITAL | Age: 33
LOS: 3 days | Discharge: HOME | End: 2018-12-30
Attending: OBSTETRICS & GYNECOLOGY | Admitting: OBSTETRICS & GYNECOLOGY
Payer: COMMERCIAL

## 2018-12-27 ENCOUNTER — ANESTHESIA (OUTPATIENT)
Dept: OBSTETRICS AND GYNECOLOGY | Facility: HOSPITAL | Age: 33
End: 2018-12-27
Payer: COMMERCIAL

## 2018-12-27 ENCOUNTER — PROCEDURE VISIT (OUTPATIENT)
Dept: MATERNAL FETAL MEDICINE | Facility: HOSPITAL | Age: 33
End: 2018-12-27
Payer: COMMERCIAL

## 2018-12-27 ENCOUNTER — ANESTHESIA EVENT (OUTPATIENT)
Dept: OBSTETRICS AND GYNECOLOGY | Facility: HOSPITAL | Age: 33
End: 2018-12-27
Payer: COMMERCIAL

## 2018-12-27 VITALS — SYSTOLIC BLOOD PRESSURE: 142 MMHG | WEIGHT: 191 LBS | BODY MASS INDEX: 34.93 KG/M2 | DIASTOLIC BLOOD PRESSURE: 100 MMHG

## 2018-12-27 DIAGNOSIS — O36.8390 FETAL TACHYCARDIA AFFECTING MANAGEMENT OF MOTHER: Primary | ICD-10-CM

## 2018-12-27 DIAGNOSIS — O14.93 PRE-ECLAMPSIA IN THIRD TRIMESTER: ICD-10-CM

## 2018-12-27 DIAGNOSIS — O14.90 PRE-ECLAMPSIA: ICD-10-CM

## 2018-12-27 PROBLEM — O13.3 GESTATIONAL HYPERTENSION, THIRD TRIMESTER: Status: ACTIVE | Noted: 2018-12-27

## 2018-12-27 PROBLEM — O36.63X0 FETAL MACROSOMIA IN THIRD TRIMESTER: Status: ACTIVE | Noted: 2018-12-27

## 2018-12-27 LAB
ABO + RH BLD: NORMAL
ALBUMIN SERPL BCP-MCNC: 2.7 G/DL
ALP SERPL-CCNC: 168 U/L
ALT SERPL W/O P-5'-P-CCNC: 15 U/L
ANION GAP SERPL CALC-SCNC: 8 MMOL/L
AST SERPL-CCNC: 25 U/L
BASOPHILS # BLD AUTO: 0.01 K/UL
BASOPHILS NFR BLD: 0.1 %
BILIRUB SERPL-MCNC: 0.3 MG/DL
BLD GP AB SCN CELLS X3 SERPL QL: NORMAL
BUN SERPL-MCNC: 11 MG/DL
CALCIUM SERPL-MCNC: 8.8 MG/DL
CHLORIDE SERPL-SCNC: 103 MMOL/L
CO2 SERPL-SCNC: 21 MMOL/L
CREAT SERPL-MCNC: 0.7 MG/DL
CREAT UR-MCNC: 148.8 MG/DL
DIFFERENTIAL METHOD: ABNORMAL
EOSINOPHIL # BLD AUTO: 0 K/UL
EOSINOPHIL NFR BLD: 0.2 %
ERYTHROCYTE [DISTWIDTH] IN BLOOD BY AUTOMATED COUNT: 14.6 %
EST. GFR  (AFRICAN AMERICAN): >60 ML/MIN/1.73 M^2
EST. GFR  (NON AFRICAN AMERICAN): >60 ML/MIN/1.73 M^2
GLUCOSE SERPL-MCNC: 79 MG/DL
HCT VFR BLD AUTO: 34 %
HGB BLD-MCNC: 11 G/DL
LDH SERPL L TO P-CCNC: 253 U/L
LYMPHOCYTES # BLD AUTO: 1.6 K/UL
LYMPHOCYTES NFR BLD: 17.7 %
MCH RBC QN AUTO: 27.4 PG
MCHC RBC AUTO-ENTMCNC: 32.4 G/DL
MCV RBC AUTO: 85 FL
MONOCYTES # BLD AUTO: 0.5 K/UL
MONOCYTES NFR BLD: 5.3 %
NEUTROPHILS # BLD AUTO: 6.9 K/UL
NEUTROPHILS NFR BLD: 76.1 %
PLATELET # BLD AUTO: 213 K/UL
PMV BLD AUTO: 11 FL
POTASSIUM SERPL-SCNC: 4 MMOL/L
PROT SERPL-MCNC: 6.1 G/DL
PROT UR-MCNC: 59 MG/DL
PROT/CREAT UR: 0.4 MG/G{CREAT}
RBC # BLD AUTO: 4.02 M/UL
SODIUM SERPL-SCNC: 132 MMOL/L
WBC # BLD AUTO: 9.09 K/UL

## 2018-12-27 PROCEDURE — 25000003 PHARM REV CODE 250: Performed by: NURSE ANESTHETIST, CERTIFIED REGISTERED

## 2018-12-27 PROCEDURE — 63600175 PHARM REV CODE 636 W HCPCS: Mod: JG | Performed by: STUDENT IN AN ORGANIZED HEALTH CARE EDUCATION/TRAINING PROGRAM

## 2018-12-27 PROCEDURE — 63600175 PHARM REV CODE 636 W HCPCS: Performed by: NURSE ANESTHETIST, CERTIFIED REGISTERED

## 2018-12-27 PROCEDURE — 27201121 HC TRAY,SPINAL-HYPERBARIC: Performed by: ANESTHESIOLOGY

## 2018-12-27 PROCEDURE — 86592 SYPHILIS TEST NON-TREP QUAL: CPT

## 2018-12-27 PROCEDURE — S0028 INJECTION, FAMOTIDINE, 20 MG: HCPCS | Performed by: STUDENT IN AN ORGANIZED HEALTH CARE EDUCATION/TRAINING PROGRAM

## 2018-12-27 PROCEDURE — 59510 CESAREAN DELIVERY: CPT | Mod: AT,,, | Performed by: OBSTETRICS & GYNECOLOGY

## 2018-12-27 PROCEDURE — 76816 OB US FOLLOW-UP PER FETUS: CPT | Mod: 26,,, | Performed by: OBSTETRICS & GYNECOLOGY

## 2018-12-27 PROCEDURE — 25000003 PHARM REV CODE 250: Performed by: STUDENT IN AN ORGANIZED HEALTH CARE EDUCATION/TRAINING PROGRAM

## 2018-12-27 PROCEDURE — 37000009 HC ANESTHESIA EA ADD 15 MINS: Performed by: OBSTETRICS & GYNECOLOGY

## 2018-12-27 PROCEDURE — 76819 FETAL BIOPHYS PROFIL W/O NST: CPT | Mod: 26,,, | Performed by: OBSTETRICS & GYNECOLOGY

## 2018-12-27 PROCEDURE — 36415 COLL VENOUS BLD VENIPUNCTURE: CPT

## 2018-12-27 PROCEDURE — 11000001 HC ACUTE MED/SURG PRIVATE ROOM

## 2018-12-27 PROCEDURE — 85025 COMPLETE CBC W/AUTO DIFF WBC: CPT

## 2018-12-27 PROCEDURE — 99211 OFF/OP EST MAY X REQ PHY/QHP: CPT | Mod: 25

## 2018-12-27 PROCEDURE — 59510 PR FULL ROUT OBSTE CARE,CESAREAN DELIV: ICD-10-PCS | Mod: AT,,, | Performed by: OBSTETRICS & GYNECOLOGY

## 2018-12-27 PROCEDURE — 37000008 HC ANESTHESIA 1ST 15 MINUTES: Performed by: OBSTETRICS & GYNECOLOGY

## 2018-12-27 PROCEDURE — 36000685 HC CESAREAN SECTION LEVEL I

## 2018-12-27 PROCEDURE — 25000003 PHARM REV CODE 250: Performed by: OBSTETRICS & GYNECOLOGY

## 2018-12-27 PROCEDURE — 51702 INSERT TEMP BLADDER CATH: CPT

## 2018-12-27 PROCEDURE — 99214 OFFICE O/P EST MOD 30 MIN: CPT | Mod: 25,,, | Performed by: OBSTETRICS & GYNECOLOGY

## 2018-12-27 PROCEDURE — S0020 INJECTION, BUPIVICAINE HYDRO: HCPCS | Performed by: OBSTETRICS & GYNECOLOGY

## 2018-12-27 PROCEDURE — 80053 COMPREHEN METABOLIC PANEL: CPT

## 2018-12-27 PROCEDURE — 63600175 PHARM REV CODE 636 W HCPCS: Performed by: OBSTETRICS & GYNECOLOGY

## 2018-12-27 PROCEDURE — 76819 FETAL BIOPHYS PROFIL W/O NST: CPT

## 2018-12-27 PROCEDURE — P9045 ALBUMIN (HUMAN), 5%, 250 ML: HCPCS | Mod: JG | Performed by: STUDENT IN AN ORGANIZED HEALTH CARE EDUCATION/TRAINING PROGRAM

## 2018-12-27 PROCEDURE — 86901 BLOOD TYPING SEROLOGIC RH(D): CPT

## 2018-12-27 PROCEDURE — 76816 OB US FOLLOW-UP PER FETUS: CPT

## 2018-12-27 PROCEDURE — 83615 LACTATE (LD) (LDH) ENZYME: CPT

## 2018-12-27 PROCEDURE — 84156 ASSAY OF PROTEIN URINE: CPT

## 2018-12-27 PROCEDURE — 63600175 PHARM REV CODE 636 W HCPCS: Performed by: ANESTHESIOLOGY

## 2018-12-27 RX ORDER — FENTANYL CITRATE/PF 250MCG/5ML
SYRINGE (ML) INTRAVENOUS
Status: COMPLETED | OUTPATIENT
Start: 2018-12-27 | End: 2018-12-27

## 2018-12-27 RX ORDER — KETOROLAC TROMETHAMINE 30 MG/ML
INJECTION, SOLUTION INTRAMUSCULAR; INTRAVENOUS
Status: DISCONTINUED | OUTPATIENT
Start: 2018-12-27 | End: 2018-12-28

## 2018-12-27 RX ORDER — OXYTOCIN/RINGER'S LACTATE 20/1000 ML
41.65 PLASTIC BAG, INJECTION (ML) INTRAVENOUS CONTINUOUS
Status: ACTIVE | OUTPATIENT
Start: 2018-12-27 | End: 2018-12-28

## 2018-12-27 RX ORDER — PHENYLEPHRINE HYDROCHLORIDE 10 MG/ML
INJECTION INTRAVENOUS
Status: DISCONTINUED | OUTPATIENT
Start: 2018-12-27 | End: 2018-12-28

## 2018-12-27 RX ORDER — HYDRALAZINE HYDROCHLORIDE 20 MG/ML
10 INJECTION INTRAMUSCULAR; INTRAVENOUS ONCE
Status: COMPLETED | OUTPATIENT
Start: 2018-12-27 | End: 2018-12-27

## 2018-12-27 RX ORDER — MISOPROSTOL 200 UG/1
200 TABLET ORAL EVERY 6 HOURS PRN
Status: DISCONTINUED | OUTPATIENT
Start: 2018-12-27 | End: 2018-12-30 | Stop reason: HOSPADM

## 2018-12-27 RX ORDER — OXYTOCIN/RINGER'S LACTATE 20/1000 ML
333 PLASTIC BAG, INJECTION (ML) INTRAVENOUS CONTINUOUS
Status: DISPENSED | OUTPATIENT
Start: 2018-12-27 | End: 2018-12-27

## 2018-12-27 RX ORDER — SODIUM CHLORIDE, SODIUM LACTATE, POTASSIUM CHLORIDE, CALCIUM CHLORIDE 600; 310; 30; 20 MG/100ML; MG/100ML; MG/100ML; MG/100ML
INJECTION, SOLUTION INTRAVENOUS CONTINUOUS
Status: DISCONTINUED | OUTPATIENT
Start: 2018-12-27 | End: 2018-12-27

## 2018-12-27 RX ORDER — SODIUM CHLORIDE, SODIUM LACTATE, POTASSIUM CHLORIDE, CALCIUM CHLORIDE 600; 310; 30; 20 MG/100ML; MG/100ML; MG/100ML; MG/100ML
INJECTION, SOLUTION INTRAVENOUS CONTINUOUS
Status: DISCONTINUED | OUTPATIENT
Start: 2018-12-27 | End: 2018-12-30 | Stop reason: HOSPADM

## 2018-12-27 RX ORDER — FAMOTIDINE 10 MG/ML
20 INJECTION INTRAVENOUS ONCE
Status: COMPLETED | OUTPATIENT
Start: 2018-12-27 | End: 2018-12-27

## 2018-12-27 RX ORDER — MISOPROSTOL 100 MCG
25 TABLET ORAL EVERY 4 HOURS
Status: DISCONTINUED | OUTPATIENT
Start: 2018-12-27 | End: 2018-12-27

## 2018-12-27 RX ORDER — IBUPROFEN 600 MG/1
600 TABLET ORAL EVERY 6 HOURS
Status: DISCONTINUED | OUTPATIENT
Start: 2018-12-28 | End: 2018-12-30 | Stop reason: HOSPADM

## 2018-12-27 RX ORDER — OXYTOCIN/RINGER'S LACTATE 20/1000 ML
2 PLASTIC BAG, INJECTION (ML) INTRAVENOUS CONTINUOUS
Status: DISCONTINUED | OUTPATIENT
Start: 2018-12-27 | End: 2018-12-28

## 2018-12-27 RX ORDER — SIMETHICONE 80 MG
1 TABLET,CHEWABLE ORAL EVERY 6 HOURS PRN
Status: DISCONTINUED | OUTPATIENT
Start: 2018-12-27 | End: 2018-12-30 | Stop reason: HOSPADM

## 2018-12-27 RX ORDER — SODIUM CHLORIDE, SODIUM LACTATE, POTASSIUM CHLORIDE, CALCIUM CHLORIDE 600; 310; 30; 20 MG/100ML; MG/100ML; MG/100ML; MG/100ML
INJECTION, SOLUTION INTRAVENOUS CONTINUOUS
Status: DISCONTINUED | OUTPATIENT
Start: 2018-12-27 | End: 2018-12-28

## 2018-12-27 RX ORDER — BUPIVACAINE HYDROCHLORIDE 5 MG/ML
10 INJECTION, SOLUTION EPIDURAL; INTRACAUDAL ONCE
Status: COMPLETED | OUTPATIENT
Start: 2018-12-27 | End: 2018-12-27

## 2018-12-27 RX ORDER — ADHESIVE BANDAGE
30 BANDAGE TOPICAL 2 TIMES DAILY PRN
Status: DISCONTINUED | OUTPATIENT
Start: 2018-12-28 | End: 2018-12-30 | Stop reason: HOSPADM

## 2018-12-27 RX ORDER — SODIUM CITRATE AND CITRIC ACID MONOHYDRATE 334; 500 MG/5ML; MG/5ML
30 SOLUTION ORAL ONCE
Status: COMPLETED | OUTPATIENT
Start: 2018-12-27 | End: 2018-12-27

## 2018-12-27 RX ORDER — DOCUSATE SODIUM 100 MG/1
200 CAPSULE, LIQUID FILLED ORAL 2 TIMES DAILY
Status: DISCONTINUED | OUTPATIENT
Start: 2018-12-27 | End: 2018-12-30 | Stop reason: HOSPADM

## 2018-12-27 RX ORDER — ONDANSETRON 8 MG/1
8 TABLET, ORALLY DISINTEGRATING ORAL EVERY 8 HOURS PRN
Status: DISCONTINUED | OUTPATIENT
Start: 2018-12-27 | End: 2018-12-28

## 2018-12-27 RX ORDER — TERBUTALINE SULFATE 1 MG/ML
0.25 INJECTION SUBCUTANEOUS
Status: DISCONTINUED | OUTPATIENT
Start: 2018-12-27 | End: 2018-12-28

## 2018-12-27 RX ORDER — NIFEDIPINE 30 MG/1
30 TABLET, EXTENDED RELEASE ORAL ONCE
Status: COMPLETED | OUTPATIENT
Start: 2018-12-27 | End: 2018-12-27

## 2018-12-27 RX ORDER — OXYTOCIN/RINGER'S LACTATE 20/1000 ML
41.7 PLASTIC BAG, INJECTION (ML) INTRAVENOUS CONTINUOUS
Status: DISPENSED | OUTPATIENT
Start: 2018-12-27 | End: 2018-12-27

## 2018-12-27 RX ORDER — BUPIVACAINE HYDROCHLORIDE 7.5 MG/ML
INJECTION, SOLUTION EPIDURAL; RETROBULBAR
Status: COMPLETED | OUTPATIENT
Start: 2018-12-27 | End: 2018-12-27

## 2018-12-27 RX ORDER — OXYCODONE AND ACETAMINOPHEN 5; 325 MG/1; MG/1
1 TABLET ORAL EVERY 4 HOURS PRN
Status: DISCONTINUED | OUTPATIENT
Start: 2018-12-27 | End: 2018-12-30 | Stop reason: HOSPADM

## 2018-12-27 RX ORDER — OXYTOCIN 10 [USP'U]/ML
INJECTION, SOLUTION INTRAMUSCULAR; INTRAVENOUS
Status: DISCONTINUED | OUTPATIENT
Start: 2018-12-27 | End: 2018-12-28

## 2018-12-27 RX ORDER — MUPIROCIN 20 MG/G
OINTMENT TOPICAL
Status: DISCONTINUED | OUTPATIENT
Start: 2018-12-27 | End: 2018-12-28

## 2018-12-27 RX ORDER — BISACODYL 10 MG
10 SUPPOSITORY, RECTAL RECTAL ONCE AS NEEDED
Status: DISCONTINUED | OUTPATIENT
Start: 2018-12-27 | End: 2018-12-30 | Stop reason: HOSPADM

## 2018-12-27 RX ORDER — MORPHINE SULFATE 0.5 MG/ML
INJECTION, SOLUTION EPIDURAL; INTRATHECAL; INTRAVENOUS
Status: COMPLETED | OUTPATIENT
Start: 2018-12-27 | End: 2018-12-27

## 2018-12-27 RX ORDER — DIPHENHYDRAMINE HCL 25 MG
25 CAPSULE ORAL EVERY 4 HOURS PRN
Status: DISCONTINUED | OUTPATIENT
Start: 2018-12-27 | End: 2018-12-30 | Stop reason: HOSPADM

## 2018-12-27 RX ORDER — MISOPROSTOL 200 UG/1
800 TABLET ORAL
Status: DISCONTINUED | OUTPATIENT
Start: 2018-12-27 | End: 2018-12-28

## 2018-12-27 RX ORDER — OXYCODONE AND ACETAMINOPHEN 10; 325 MG/1; MG/1
1 TABLET ORAL EVERY 4 HOURS PRN
Status: DISCONTINUED | OUTPATIENT
Start: 2018-12-27 | End: 2018-12-30 | Stop reason: HOSPADM

## 2018-12-27 RX ORDER — ALBUMIN HUMAN 50 G/1000ML
25 SOLUTION INTRAVENOUS ONCE
Status: COMPLETED | OUTPATIENT
Start: 2018-12-27 | End: 2018-12-27

## 2018-12-27 RX ORDER — NIFEDIPINE 30 MG/1
30 TABLET, EXTENDED RELEASE ORAL DAILY
Status: DISCONTINUED | OUTPATIENT
Start: 2018-12-28 | End: 2018-12-30 | Stop reason: HOSPADM

## 2018-12-27 RX ORDER — CEFAZOLIN SODIUM 2 G/50ML
2 SOLUTION INTRAVENOUS
Status: COMPLETED | OUTPATIENT
Start: 2018-12-27 | End: 2018-12-27

## 2018-12-27 RX ORDER — BUTORPHANOL TARTRATE 2 MG/ML
1 INJECTION INTRAMUSCULAR; INTRAVENOUS
Status: DISCONTINUED | OUTPATIENT
Start: 2018-12-27 | End: 2018-12-28

## 2018-12-27 RX ORDER — HYDRALAZINE HYDROCHLORIDE 20 MG/ML
10 INJECTION INTRAMUSCULAR; INTRAVENOUS EVERY 4 HOURS PRN
Status: DISCONTINUED | OUTPATIENT
Start: 2018-12-27 | End: 2018-12-30 | Stop reason: HOSPADM

## 2018-12-27 RX ADMIN — Medication 41.65 MILLI-UNITS/MIN: at 11:12

## 2018-12-27 RX ADMIN — PHENYLEPHRINE HYDROCHLORIDE 100 MCG: 10 INJECTION INTRAVENOUS at 05:12

## 2018-12-27 RX ADMIN — NIFEDIPINE 30 MG: 30 TABLET, FILM COATED, EXTENDED RELEASE ORAL at 07:12

## 2018-12-27 RX ADMIN — Medication 25 MCG: at 05:12

## 2018-12-27 RX ADMIN — KETOROLAC TROMETHAMINE 60 MG: 30 INJECTION, SOLUTION INTRAMUSCULAR; INTRAVENOUS at 06:12

## 2018-12-27 RX ADMIN — ONDANSETRON 8 MG: 8 TABLET, ORALLY DISINTEGRATING ORAL at 08:12

## 2018-12-27 RX ADMIN — SODIUM CHLORIDE, SODIUM LACTATE, POTASSIUM CHLORIDE, AND CALCIUM CHLORIDE: .6; .31; .03; .02 INJECTION, SOLUTION INTRAVENOUS at 04:12

## 2018-12-27 RX ADMIN — MUPIROCIN: 20 OINTMENT TOPICAL at 04:12

## 2018-12-27 RX ADMIN — OXYTOCIN 10 UNITS: 10 INJECTION, SOLUTION INTRAMUSCULAR; INTRAVENOUS at 05:12

## 2018-12-27 RX ADMIN — BUPIVACAINE HYDROCHLORIDE 50 MG: 5 INJECTION, SOLUTION EPIDURAL; INTRACAUDAL at 06:12

## 2018-12-27 RX ADMIN — PROMETHAZINE HYDROCHLORIDE 12.5 MG: 25 INJECTION INTRAMUSCULAR; INTRAVENOUS at 09:12

## 2018-12-27 RX ADMIN — SODIUM CHLORIDE, SODIUM LACTATE, POTASSIUM CHLORIDE, AND CALCIUM CHLORIDE: .6; .31; .03; .02 INJECTION, SOLUTION INTRAVENOUS at 05:12

## 2018-12-27 RX ADMIN — FAMOTIDINE 20 MG: 10 INJECTION, SOLUTION INTRAVENOUS at 04:12

## 2018-12-27 RX ADMIN — Medication 41.67 MILLI-UNITS/MIN: at 06:12

## 2018-12-27 RX ADMIN — HYDRALAZINE HYDROCHLORIDE 10 MG: 20 INJECTION INTRAMUSCULAR; INTRAVENOUS at 01:12

## 2018-12-27 RX ADMIN — IBUPROFEN 600 MG: 600 TABLET, FILM COATED ORAL at 11:12

## 2018-12-27 RX ADMIN — BUPIVACAINE HYDROCHLORIDE 1.6 ML: 7.5 INJECTION, SOLUTION EPIDURAL; RETROBULBAR at 05:12

## 2018-12-27 RX ADMIN — SODIUM CHLORIDE, SODIUM LACTATE, POTASSIUM CHLORIDE, AND CALCIUM CHLORIDE: .6; .31; .03; .02 INJECTION, SOLUTION INTRAVENOUS at 01:12

## 2018-12-27 RX ADMIN — HYDRALAZINE HYDROCHLORIDE 10 MG: 20 INJECTION INTRAMUSCULAR; INTRAVENOUS at 04:12

## 2018-12-27 RX ADMIN — OXYTOCIN 20 MILLI-UNITS/MIN: 10 INJECTION, SOLUTION INTRAMUSCULAR; INTRAVENOUS at 05:12

## 2018-12-27 RX ADMIN — CEFAZOLIN SODIUM 2 G: 2 SOLUTION INTRAVENOUS at 04:12

## 2018-12-27 RX ADMIN — MORPHINE SULFATE 0.2 MG: 0.5 INJECTION, SOLUTION EPIDURAL; INTRATHECAL; INTRAVENOUS at 05:12

## 2018-12-27 RX ADMIN — DOCUSATE SODIUM 200 MG: 100 CAPSULE, LIQUID FILLED ORAL at 09:12

## 2018-12-27 RX ADMIN — ALBUMIN HUMAN 25 G: 0.05 INJECTION, SOLUTION INTRAVENOUS at 04:12

## 2018-12-27 RX ADMIN — SODIUM CITRATE AND CITRIC ACID MONOHYDRATE 30 ML: 500; 334 SOLUTION ORAL at 04:12

## 2018-12-27 NOTE — ANESTHESIA PROCEDURE NOTES
Spinal    Diagnosis: pre-eclampsia at 37 weeks gestation  Patient location during procedure: OR  Start time: 12/27/2018 5:14 PM  Timeout: 12/27/2018 5:11 PM  End time: 12/27/2018 5:26 PM  Staffing  Anesthesiologist: Cherrie Garner MD  Performed: anesthesiologist   Preanesthetic Checklist  Completed: patient identified, pre-op evaluation, timeout performed, IV checked, risks and benefits discussed and monitors and equipment checked  Spinal Block  Patient position: sitting  Prep: ChloraPrep  Patient monitoring: heart rate, cardiac monitor, continuous pulse ox and frequent blood pressure checks  Approach: midline  Location: L3-4  Injection technique: single shot  CSF Fluid: clear free-flowing CSF  Needle  Needle type: pencil-tip   Needle gauge: 25 G  Needle length: 3.5 in  Additional Documentation: left transient paresthesia  Needle localization: anatomical landmarks  Assessment  Sensory level: T6   Dermatomal levels determined by alcohol wipe  Ease of block: moderate  Patient's tolerance of the procedure: comfortable throughout block  Additional Notes  I personally preformed this procedure.

## 2018-12-27 NOTE — H&P
HPI:   Michael Ashley is a 33 y.o. female  at 37 weeks 2 days EGA who presents here for delivery per recommendation of M, Dr. Davison, 2/2 INCREASED SWELLING AND ELEVATED BLOOD PRESSURE. As well fetus is large and maybe macrosomic and after exam and discussion with patient we will do  as her cervix is unfavorable and would be a long induction with high risk of failure. She states everyone in her family had a . She is in agreement with plan. She denies headache but does have sinus pressure. No RUQ pain or blurry vision. Her prenatal care was uncomplicated.    ROS:  GENERAL: Denies weight gain or weight loss. Feeling well overall.   SKIN: Denies rash or lesions.   HEAD: Denies head injury or headache.   CHEST: Denies chest pain or shortness of breath.   CARDIOVASCULAR: Denies palpitations or left sided chest pain.   ABDOMEN: No constipation, diarrhea, nausea, vomiting or rectal bleeding.   URINARY: No frequency, dysuria, hematuria, or burning on urination.  REPRODUCTIVE: See HPI.     Past Medical History:   Diagnosis Date    Patient denies medical problems      Past Surgical History:   Procedure Laterality Date    WISDOM TOOTH EXTRACTION       Family History   Problem Relation Age of Onset    Breast cancer Paternal Aunt     Arrhythmia Neg Hx     Cardiomyopathy Neg Hx     Congenital heart disease Neg Hx     Early death Neg Hx     Heart attacks under age 50 Neg Hx     Pacemaker/defibrilator Neg Hx      Shellfish containing products and Iodine and iodide containing products       PE:   LMP 2018 (Exact Date)   APPEARANCE: Well nourished, well developed, in no acute distress.  CHEST: Lungs clear to auscultation.  HEART: Regular rate and rhythm, no murmurs, rubs or gallops.  ABDOMEN:  Gravid. NTND soft   EXTREMITIES: NT 1+ edema DTRs 1+  SVE: 0.5/30    Assessment:  IUP 37 weeks 2 days  Gestational HTN  Unfavorable cervix  Possible macrosomic infant  Category 1 Fetal Heart  Tracing    Plan:   Primary   Magnesium if headache or sever range BP  Hydralazine ordered prn.

## 2018-12-27 NOTE — PROGRESS NOTES
"Indication  ========    Follow Up Consultation: Evaluation of fetal growth.    Maternal Assessment  =================    Weight 87 kg  Weight (lb) 191 lb  BP syst 142 mmHg  BP diast 100 mmHg  Other: repeat 160/100    Method  ======    2D Color Doppler, , Ten iU22, Transabdominal ultrasound examination. View: Suboptimal view: limited by late gestational age.    Pregnancy  =========    Hope pregnancy. Number of fetuses: 1.    Dating  ======    Cycle: regular cycle  GA by "stated dating" 37 w + 2 d  PORTER by "stated dating": 1/15/2019  Ultrasound examination on: 12/27/2018  GA by U/S based upon: AC, BPD, Femur  GA by U/S 39 w + 4 d  PORTER by U/S: 12/30/2018  Assigned: Dating performed on 12/6/2018, based on the external assessment  Assigned GA 37 w + 2 d  Assigned PORTER: 1/15/2019    General Evaluation  ==============    Cardiac activity: present.  bpm.  Fetal movements: visualized.  Presentation: cephalic.  Placenta:  Placental site: anterior.  Umbilical cord: Cord vessels: 3 vessel cord.  Amniotic fluid: Amount of AF: normal amount. MVP 7.3 cm.    Biophysical Profile  ==============    2: Fetal breathing movements  2: Gross body movements  2: Fetal tone  2: Amniotic fluid volume  8/8: Biophysical profile score    Fetal Biometry  ============    Fetal Biometry  BPD 98.1 mm 40w 1d Hadlock  .5 mm  .8 mm 40w 1d Hadlock  Femur 74.8 mm 38w 2d Hadlock  EFW 4,026 g 96% Jeffery  Calculated by: Hadlock (BPD-HC-AC-FL)  EFW (lb) 8 lb  EFW (oz) 14 oz  HC / AC 1.02  FL / BPD 0.76  FL / AC 0.21  MVP 7.3 cm   bpm    Fetal Anatomy  ===========    Cranium: normal  Cerebellum: suboptimal  Cisterna magna: suboptimal  Posterior fossa: suboptimal  4-chamber view: normal  Heart / Thorax: Fetal Echocardiogram: WNL  Cord insertion: suboptimal  Stomach: normal  Kidneys: normal  Bladder: normal  Genitals: normal  Cervical spine: suboptimal  Thoracic spine: suboptimal  Lumbar spine: suboptimal  Sacral " spine: suboptimal  Gender: male  Wants to know gender: yes  Other: A full anatomy survey previously performed.    Consultation  ==========    142/100; 160/100 on repeat.  Patient with swelling of legs, hands, face.  Discussed concerns for GHTN v. preeclampsia. Discussed at this gestational age, delivery is recommended.  Discussed may require IV meds/Mag sulfate infusion if evidence of preeclampsia with severe features.  Discussed size of baby; OB on L&D to evaluate for IOL v. primary CD. Discussed would not typically recommend for primary CD unless EFW  4500 g with DM or 5000 g without diabetes unless other findings on exam or with maternal-fetal status. Discussed primary OB or covering OB  can evaluate and help determine appropriate mode of delivery.  I spent 25 minutes on the consultation today with the patient and her . More than 50% of the consultation was spent in face-to-face  counseling and coordination of care.    Impression  =========    Hope, living IUP.  LGA growth pattern with EFW of 96%. (4026 g).  The AC measures 3-4 weeks ahead.  BPP 8/8.  Normal AFV.    Recommendation  ==============    Recommend delivery due to gestational hypertension v. preeclampsia.  Patient sent to L&D; Dr. Keller notified (she will notify Dr. Zavala).

## 2018-12-28 LAB
BASOPHILS # BLD AUTO: 0.01 K/UL
BASOPHILS NFR BLD: 0.1 %
DIFFERENTIAL METHOD: ABNORMAL
EOSINOPHIL # BLD AUTO: 0 K/UL
EOSINOPHIL NFR BLD: 0.1 %
ERYTHROCYTE [DISTWIDTH] IN BLOOD BY AUTOMATED COUNT: 14.7 %
HCT VFR BLD AUTO: 27.3 %
HGB BLD-MCNC: 8.8 G/DL
LYMPHOCYTES # BLD AUTO: 1.6 K/UL
LYMPHOCYTES NFR BLD: 16.6 %
MCH RBC QN AUTO: 27.2 PG
MCHC RBC AUTO-ENTMCNC: 32.2 G/DL
MCV RBC AUTO: 85 FL
MONOCYTES # BLD AUTO: 0.6 K/UL
MONOCYTES NFR BLD: 6.2 %
NEUTROPHILS # BLD AUTO: 7.6 K/UL
NEUTROPHILS NFR BLD: 76.7 %
PLATELET # BLD AUTO: 165 K/UL
PMV BLD AUTO: 10.8 FL
RBC # BLD AUTO: 3.23 M/UL
RPR SER QL: NORMAL
WBC # BLD AUTO: 9.86 K/UL

## 2018-12-28 PROCEDURE — 36415 COLL VENOUS BLD VENIPUNCTURE: CPT

## 2018-12-28 PROCEDURE — 25000003 PHARM REV CODE 250: Performed by: OBSTETRICS & GYNECOLOGY

## 2018-12-28 PROCEDURE — 85025 COMPLETE CBC W/AUTO DIFF WBC: CPT

## 2018-12-28 PROCEDURE — 11000001 HC ACUTE MED/SURG PRIVATE ROOM

## 2018-12-28 RX ORDER — MUPIROCIN 20 MG/G
1 OINTMENT TOPICAL 2 TIMES DAILY
Status: DISCONTINUED | OUTPATIENT
Start: 2018-12-28 | End: 2018-12-30 | Stop reason: HOSPADM

## 2018-12-28 RX ORDER — BUTORPHANOL TARTRATE 2 MG/ML
2 INJECTION INTRAMUSCULAR; INTRAVENOUS
Status: DISCONTINUED | OUTPATIENT
Start: 2018-12-28 | End: 2018-12-30 | Stop reason: HOSPADM

## 2018-12-28 RX ORDER — OXYCODONE AND ACETAMINOPHEN 5; 325 MG/1; MG/1
1 TABLET ORAL EVERY 4 HOURS PRN
Qty: 40 TABLET | Refills: 0 | Status: SHIPPED | OUTPATIENT
Start: 2018-12-28 | End: 2019-02-11

## 2018-12-28 RX ORDER — NIFEDIPINE 30 MG/1
30 TABLET, EXTENDED RELEASE ORAL DAILY
Qty: 30 TABLET | Refills: 1 | Status: SHIPPED | OUTPATIENT
Start: 2018-12-28 | End: 2019-12-28

## 2018-12-28 RX ORDER — MISOPROSTOL 200 UG/1
600 TABLET ORAL
Status: DISCONTINUED | OUTPATIENT
Start: 2018-12-28 | End: 2018-12-30 | Stop reason: HOSPADM

## 2018-12-28 RX ORDER — ONDANSETRON 4 MG/1
4 TABLET, ORALLY DISINTEGRATING ORAL EVERY 6 HOURS PRN
Qty: 40 TABLET | Refills: 0 | Status: SHIPPED | OUTPATIENT
Start: 2018-12-28 | End: 2019-01-07

## 2018-12-28 RX ORDER — IBUPROFEN 600 MG/1
600 TABLET ORAL EVERY 6 HOURS
Qty: 60 TABLET | Refills: 0 | Status: SHIPPED | OUTPATIENT
Start: 2018-12-28

## 2018-12-28 RX ORDER — ONDANSETRON 8 MG/1
8 TABLET, ORALLY DISINTEGRATING ORAL EVERY 8 HOURS PRN
Status: DISCONTINUED | OUTPATIENT
Start: 2018-12-28 | End: 2018-12-30 | Stop reason: HOSPADM

## 2018-12-28 RX ADMIN — SODIUM CHLORIDE, SODIUM LACTATE, POTASSIUM CHLORIDE, AND CALCIUM CHLORIDE: .6; .31; .03; .02 INJECTION, SOLUTION INTRAVENOUS at 08:12

## 2018-12-28 RX ADMIN — OXYCODONE HYDROCHLORIDE AND ACETAMINOPHEN 1 TABLET: 5; 325 TABLET ORAL at 09:12

## 2018-12-28 RX ADMIN — MUPIROCIN 1 G: 20 OINTMENT TOPICAL at 08:12

## 2018-12-28 RX ADMIN — DOCUSATE SODIUM 200 MG: 100 CAPSULE, LIQUID FILLED ORAL at 08:12

## 2018-12-28 RX ADMIN — OXYCODONE HYDROCHLORIDE AND ACETAMINOPHEN 1 TABLET: 10; 325 TABLET ORAL at 10:12

## 2018-12-28 RX ADMIN — ONDANSETRON 8 MG: 8 TABLET, ORALLY DISINTEGRATING ORAL at 09:12

## 2018-12-28 RX ADMIN — IBUPROFEN 600 MG: 600 TABLET, FILM COATED ORAL at 05:12

## 2018-12-28 RX ADMIN — MUPIROCIN 1 G: 20 OINTMENT TOPICAL at 01:12

## 2018-12-28 RX ADMIN — DOCUSATE SODIUM 200 MG: 100 CAPSULE, LIQUID FILLED ORAL at 09:12

## 2018-12-28 RX ADMIN — SODIUM CHLORIDE, SODIUM LACTATE, POTASSIUM CHLORIDE, AND CALCIUM CHLORIDE: .6; .31; .03; .02 INJECTION, SOLUTION INTRAVENOUS at 01:12

## 2018-12-28 RX ADMIN — NIFEDIPINE 30 MG: 30 TABLET, FILM COATED, EXTENDED RELEASE ORAL at 08:12

## 2018-12-28 RX ADMIN — ONDANSETRON 8 MG: 8 TABLET, ORALLY DISINTEGRATING ORAL at 10:12

## 2018-12-28 RX ADMIN — MUPIROCIN 1 G: 20 OINTMENT TOPICAL at 09:12

## 2018-12-28 RX ADMIN — IBUPROFEN 600 MG: 600 TABLET, FILM COATED ORAL at 12:12

## 2018-12-28 NOTE — PLAN OF CARE
1230 Pt is a , IUP 37w2d IUP sent over from [facility] with elevated BP ,R/O pre E and LGA.  1300 Dr. Keller on the unit discussed C?s vs. IOL and severe BP and LGA baby

## 2018-12-28 NOTE — PHYSICIAN QUERY
PT Name: Michael Ashley  MR #: [id number]     Physician Query Form - Documentation Clarification      CDS/: JILLIAN Ware,[email or web address]          Contact information:[email or web address]    This form is a permanent document in the medical record.     Query Date: 2018    By submitting this query, we are merely seeking further clarification of documentation. Please utilize your independent clinical judgment when addressing the question(s) below.    The Medical record reflects the following:    Supporting Clinical Findings Location in Medical Record   S/P   Pre-eclampsia--on Procardia 30XL    No symptoms of pre-eclampsia    Pre-Operative Diagnosis:   IUP 37 weeks 3 days  Possible macrosomia   Gestational HTN     Post-Operative Diagnosis:   Same  Viable male infant     Surgery:   Primary LTCS    HK=895/90, 192/86, 180/102    Prot/Creat Ratio, Ur=0.40 OB Progress note @820am          L&D Delivery note                         Flowsheet -    LAB                                                                                       Doctor, Please clarify conflicting documentation of hypertension complicating childbirth.    Provider Use Only      [x  ] Mild pre-eclampsia  [  ] Severe pre-eclampsia  [  ] Gestational hypertension  [  ] Other, please specify:_____________________________________                                                                                                                     [  ] Clinically Undetermined

## 2018-12-28 NOTE — PLAN OF CARE
Problem: Adult Inpatient Plan of Care  Goal: Plan of Care Review  Outcome: Ongoing (interventions implemented as appropriate)     Patient with Webber catheter throughout the night; d/c'd @0540 as per MD order. Tolerating clear liquids; diet advanced to regular for breakfast. IVF's infusing as ordered.  LTV incision with aquacel dressing c/d/i.  Patient reports pain controlled with ordered medications.  Lochia rubra and light.  Fundus firm and at umbilicus. Plan of care reviewed with patient; questions answered/encouraged.  Bonding with baby AEB holding, feeding, dressing, and changing baby's diaper. Smiles appropriately at baby. Spouse at bedside. Call light in reach, side rails up x2, nonskid socks on, bed in lowest position with wheels locked.  Remains free from falls and/or injury. VSS. NAD noted. Will continue to monitor.

## 2018-12-28 NOTE — PROGRESS NOTES
"POD #1 s/p     Subjective: No complaints. No flatus. No symptoms of pre-eclampsia    Objective: /76   Pulse 83   Temp 98.6 °F (37 °C) (Oral)   Resp 18   Ht 5' 2" (1.575 m)   Wt 90.7 kg (200 lb)   LMP 2018 (Exact Date)   SpO2 95%   Breastfeeding? Unknown   BMI 36.58 kg/m²     H/H:   Lab Results   Component Value Date    WBC 9.86 2018    HGB 8.8 (L) 2018    HCT 27.3 (L) 2018    MCV 85 2018     2018       Chest: Clear to auscultation  CV: Regular rate and rhythm  Abdomen: Non-tender, non-distended, soft, positive bowel sounds  Incision: Bandaged  Extremities: Non-tender, 2+ edema, DTR's 1+    Assessment:   S/P   Pre-eclampsia--on Procardia 30XL  Dr. Zavala patient    Plan:   Routine progressive care  Rx sent to pharmacy downstairs  Baby needs circ      "

## 2018-12-28 NOTE — NURSING
() - Received report from JOSY Walls RN and assumed care of 34 y/o , s/p Primary  secondary to elevated blood pressures and fetal tachycardia.  VSS, blood pressures elevated, Dr. Keller aware.  Infant and family members at bedside.  Nursing assessment initiated.  See flowsheets for complete and continuing assessments.  () - Procardia 30 mg PO given, per MD orders.  () - Patient with complaint of Nausea, Zofran 8 mg PO given, per MD orders.  () - VSS.  Patient still with complaint of nausea.  Pads changed, (2) 10 cm. Clots collected, pericare given, no c/o pain, NAD noted.  () - Phenergan 12.5 mg. IVPB up, per MD orders.  () - Colace 200 mg PO given, per MD orders.  () - Motrin 600 mg PO given, per MD orders.  (25) - Spoke with Dr. Keller, update given on patients vitas, orders given to transfer to [facility] - read back and verbalized.  (0035) - Pericare given, pads changed, rodriguez emptied.  Patient without complaints of pain, NAD noted. Patient to be transferred to room 353.  (005) - Patient transferred to room 353, report given to Tara Harrison RN.

## 2018-12-28 NOTE — L&D DELIVERY NOTE
[facility]   Section   Operative Note    SUMMARY     Date of Surgery: 18    Pre-Operative Diagnosis:   IUP 37 weeks 3 days  Possible macrosomia   Gestational HTN    Post-Operative Diagnosis:   Same  Viable male infant      Surgery:   Primary LTCS    Surgeon: MD Sweta  Assistant: ALAN Vega LPN    Anesthesia: Epidural/ Spinal/Local    EBL: 400 cc    Findings:   Normal bilateral tubes and ovaries.   Normal uterus  Viable male infant with 9/9 Apgars  Body cord      Specimens: None    Complications: None  With patient in supine position, the legs are  and Webber Catheter placed and positioning to supine done.   Abdomen prepped with Chloroprep and 3 minute drying time allowed prior to draping of the abdomen.   Time out taken with OR team members.  Pfannenstiel Incision made through the skin, transverse fascial incision developed, rectus muscles  in the midline and the peritoneum entered.   no adhesions noted.  The lower uterine segment and position of the fetus identified.   Bladder flap taken down through transverse peritoneal incision.    Low Transverse Incision made through well developed lower uterine segment and extended laterally with blunt dissection.   Clear fluid noted.  Infant delivered from vertex presentation.  Cord clamped after one minute and  handed to attending nurse.  Cord blood taken, placenta delivered.  The uterus was exteriorized.  Closure with running lock 0 Chromic, starting at right angle and tied at the left angle. Observation for bleeding along the hysterotomy line. Excellent hemostasis was noted and Dg hemostatic agent was placed over hysterotomy site.    Uterus, right and left adnexa with normal anatomy.Closure of the rectus muscles with 0 Chromic suture in an interupted fashion. Fascial closure with 0 Vicryl starting at the right angle and tying the knot at the left angle. Bupivacaine was injected under the fascial  layer.  Skin closure with 4 0 Monocryl subcuticular.  Wound dressed with Dermaflex surgical glue.   The patient tolerated the procedure well and all counts were correct x 2.  The patient was taken to recovery room in stable condition.          Condition: Good    Disposition: PACU - hemodynamically stable.    Attestation: Good         Delivery Information for  Jason Ashley    Birth information:  YOB: 2016   Time of birth: 5:41 PM   Sex: male   Head Delivery Date/Time: 2018  5:41 PM   Delivery type: , Low Transverse   Gestational Age: 37w2d    Delivery Providers    Delivering clinician:  Tiffani Keller MD   Provider Role    Katelyn Walls RN Circulator    Cely Vega LPN First Assist    Inga aLo CRNA Nurse Anesthetist    Cherrie Garner MD Anesthesiologist    India Trejo Surgical Tech            Measurements    Weight:    Length:           Apgars    Living status:    Apgars:   1 min.:   5 min.:   10 min.:   15 min.:   20 min.:     Skin color:          Heart rate:          Reflex irritability:          Muscle tone:          Respiratory effort:          Total:                 Operative Delivery    Forceps attempted?:  No  Vacuum extractor attempted?:  No         Shoulder Dystocia    Shoulder dystocia present?:  No           Presentation    Presentation:  Vertex  Position:  Left Occiput Anterior           Interventions/Resuscitation         Cord    No data filed        Placenta    Placenta delivery date/time:    Placenta removal:             Labor Events:       labor: No     Labor Onset Date/Time:         Dilation Complete Date/Time:         Start Pushing Date/Time:       Rupture Date/Time:              Rupture type:           Fluid Amount:        Fluid Color:        Fluid Odor:        Membrane Status (PeriCalm):        Rupture Date/Time (PeriCalm):        Fluid Amount (PeriCalm):        Fluid Color (PeriCalm):         steroids:  None     Antibiotics given for GBS: No     Induction:       Indications for induction:        Augmentation:       Indications for augmentation:       Labor complications: None     Additional complications:          Cervical ripening:                     Delivery:      Episiotomy:       Indication for Episiotomy:       Perineal Lacerations:   Repaired:      Periurethral Laceration:   Repaired:     Labial Laceration:   Repaired:     Sulcus Laceration:   Repaired:     Vaginal Laceration:   Repaired:     Cervical Laceration:   Repaired:     Repair suture:       Repair # of packets:       Vaginal delivery QBL (mL): 0      QBL (mL): 470     Combined Blood Loss (mL): 470     Vaginal Sweep Performed:       Surgicount Correct:         Other providers:       Anesthesia    Method:  Spinal          Details (if applicable):  Trial of Labor No    Categorization: Primary    Priority: Routine   Indications for : Other (Add Comments)   Incision Type: low transverse     Additional  information:  Forceps:    Vacuum:    Breech:    Observed anomalies    Other (Comments):

## 2018-12-28 NOTE — ANESTHESIA POSTPROCEDURE EVALUATION
"Anesthesia Post Evaluation    Patient: Michael Ashley    Procedure(s) Performed: Procedure(s) (LRB):  DELIVERY-CEASAREAN SECTION (N/A)    Final Anesthesia Type: spinal  Patient location during evaluation: labor & delivery  Patient participation: Yes- Able to Participate  Level of consciousness: awake and alert and oriented  Post-procedure vital signs: reviewed and stable  Pain management: adequate  Airway patency: patent  PONV status at discharge: No PONV  Anesthetic complications: no      Cardiovascular status: blood pressure returned to baseline and hemodynamically stable  Respiratory status: unassisted  Hydration status: euvolemic  Follow-up not needed.  Comments: Patient has been ambulating without difficulty, voiding spontaneously, and denies any headache or back pain.         Visit Vitals  /76   Pulse 83   Temp 37 °C (98.6 °F) (Oral)   Resp 18   Ht 5' 2" (1.575 m)   Wt 90.7 kg (200 lb)   LMP 04/11/2018 (Exact Date)   SpO2 95%   Breastfeeding? Unknown   BMI 36.58 kg/m²       Pain/Lane Score: Pain Rating Prior to Med Admin: 0 (12/28/2018  5:32 AM)        "

## 2018-12-29 PROCEDURE — 25000003 PHARM REV CODE 250: Performed by: OBSTETRICS & GYNECOLOGY

## 2018-12-29 PROCEDURE — 11000001 HC ACUTE MED/SURG PRIVATE ROOM

## 2018-12-29 RX ADMIN — ONDANSETRON 8 MG: 8 TABLET, ORALLY DISINTEGRATING ORAL at 09:12

## 2018-12-29 RX ADMIN — OXYCODONE HYDROCHLORIDE AND ACETAMINOPHEN 1 TABLET: 10; 325 TABLET ORAL at 09:12

## 2018-12-29 RX ADMIN — IBUPROFEN 600 MG: 600 TABLET, FILM COATED ORAL at 12:12

## 2018-12-29 RX ADMIN — NIFEDIPINE 30 MG: 30 TABLET, FILM COATED, EXTENDED RELEASE ORAL at 09:12

## 2018-12-29 RX ADMIN — MUPIROCIN 1 G: 20 OINTMENT TOPICAL at 09:12

## 2018-12-29 RX ADMIN — DOCUSATE SODIUM 200 MG: 100 CAPSULE, LIQUID FILLED ORAL at 08:12

## 2018-12-29 RX ADMIN — MUPIROCIN 1 G: 20 OINTMENT TOPICAL at 08:12

## 2018-12-29 RX ADMIN — OXYCODONE HYDROCHLORIDE AND ACETAMINOPHEN 1 TABLET: 10; 325 TABLET ORAL at 02:12

## 2018-12-29 RX ADMIN — IBUPROFEN 600 MG: 600 TABLET, FILM COATED ORAL at 05:12

## 2018-12-29 RX ADMIN — OXYCODONE HYDROCHLORIDE AND ACETAMINOPHEN 1 TABLET: 10; 325 TABLET ORAL at 08:12

## 2018-12-29 RX ADMIN — DOCUSATE SODIUM 200 MG: 100 CAPSULE, LIQUID FILLED ORAL at 09:12

## 2018-12-29 NOTE — LACTATION NOTE
[facility]  Lactation Note - Mom    SUMMARY     Maternal Assessment    Breast Density: Bilateral:, soft  Areola: Bilateral:, elastic  Nipples: Bilateral:, graspable, everted  Preferred Pain Scale: number (Numeric Rating Pain Scale)  Comfort/Acceptable Pain Level: 4  Pain Body Location: abdomen  Pain Rating (0-10): Rest: 0  Pain Rating (0-10): Activity: 0  Pain Rating: Rest: 0 - no pain  Pain Rating: Activity: 0 - no pain  Quality: aching, burning, cramping  Pain Management Interventions: around-the-clock dosing utilized, pain management plan reviewed with patient/caregiver  Sleep/Rest/Relaxation: awake, no problem identified  POSS (Pasero Opioid-Induced Sed Scale): 1 - Awake and alert    LATCH Score         Breasts WDL    Breast WDL: WDL    Maternal Infant Feeding    Maternal Preparation: breast care  Maternal Emotional State: relaxed, assist needed  Infant Positioning: cross-cradle  Signs of Milk Transfer: infant jaw motion present  Pain with Feeding: no  Comfort Measures Before/During Feeding: infant position adjusted, latch adjusted  Comfort Measures Following Feeding: expressed milk applied  Latch Assistance: yes    Lactation Referrals    Lactation Referrals: pediatric care provider  Pediatric Care Provider Lactation Follow-up Date/Time: within 2-3 days    Lactation Interventions    Breast Care: Breastfeeding: breast milk to nipples, milk massaged towards nipple  Breastfeeding Assistance: assisted with positioning, assisted with techniques for flat/inverted nipples, feeding cue recognition promoted, feeding on demand promoted, feeding session observed, infant latch-on verified, infant stimulated to wakeful state, infant suck/swallow verified, support offered  Breast Care: Breastfeeding: breast milk to nipples, milk massaged towards nipple  Breastfeeding Assistance: assisted with positioning, assisted with techniques for flat/inverted nipples, feeding cue recognition promoted, feeding on  demand promoted, feeding session observed, infant latch-on verified, infant stimulated to wakeful state, infant suck/swallow verified, support offered  Breastfeeding Support: diary/feeding log utilized, encouragement provided, lactation counseling provided, maternal rest encouraged       Breastfeeding Session    Infant Positioning: cross-cradle  Signs of Milk Transfer: infant jaw motion present    Maternal Information

## 2018-12-29 NOTE — PROGRESS NOTES
Post op day 2 from prim section  Vss, no co  osito diet, voiding well  abd soft  bndage dry/intact  extr nromal  A stable  Plan prog care

## 2018-12-29 NOTE — PLAN OF CARE
Problem: Adult Inpatient Plan of Care  Goal: Plan of Care Review  Outcome: Ongoing (interventions implemented as appropriate)  Pt AAOx4, reported mild abd pain, pain meds given as scheduled , incision CDI, denies abnormal bleeding. VSS. Baby in room with both parents during the entire shift. The rest of the assessment unremarkable. Mother and baby Fall risks education emphasized, mother was instructed to call for help whenever she needs. Both parents verbalized understandings. All safety precautions in place, baby crib near mother's bed, within easy reach.

## 2018-12-29 NOTE — PLAN OF CARE
Problem: Adult Inpatient Plan of Care  Goal: Plan of Care Review  Outcome: Outcome(s) achieved Date Met: 12/29/18  Mom will continue to exclusively breastfeed frequently & on cue at least 8+ times/24 hrs.  Will monitor for signs of adequate fdg.  Will have baby's weight checked at ped's office in the next couple of days.  Will call for any needs.

## 2018-12-29 NOTE — PLAN OF CARE
Problem: Adult Inpatient Plan of Care  Goal: Plan of Care Review  Outcome: Ongoing (interventions implemented as appropriate)  Mom will continue to breastfeed frequently & on cue at least 8+ times/24 hrs.  Will monitor for signs of adequate fdg.  Will call for any needs.

## 2018-12-29 NOTE — LACTATION NOTE
[facility]  Lactation Note - Mom    SUMMARY     Maternal Assessment    Breast Density: Bilateral:, soft  Areola: Bilateral:, elastic  Nipples: Bilateral:, graspable, everted  Preferred Pain Scale: number (Numeric Rating Pain Scale)  Comfort/Acceptable Pain Level: 4  Pain Body Location - Side: Bilateral  Pain Body Location - Orientation: lower  Pain Body Location: abdomen  Pain Rating (0-10): Rest: 0  Pain Rating (0-10): Activity: 4  Pain Rating: Rest: 0 - no pain  Pain Rating: Activity: 0 - no pain  Quality: aching, burning, cramping  Pain Management Interventions: around-the-clock dosing utilized, pain management plan reviewed with patient/caregiver  Sleep/Rest/Relaxation: awake, no problem identified  POSS (Pasero Opioid-Induced Sed Scale): 1 - Awake and alert    LATCH Score         Breasts WDL    Breast WDL: WDL    Maternal Infant Feeding    Maternal Preparation: breast care, hand hygiene  Maternal Emotional State: independent, relaxed  Infant Positioning: cross-cradle  Signs of Milk Transfer: infant jaw motion present(per mom;just finished BR)  Pain with Feeding: no  Comfort Measures Before/During Feeding: infant position adjusted, latch adjusted  Comfort Measures Following Feeding: expressed milk applied  Nipple Shape After Feeding, Left: round  Latch Assistance: no    Lactation Referrals    Lactation Referrals: pediatric care provider  Pediatric Care Provider Lactation Follow-up Date/Time: within 2-3 days    Lactation Interventions    Breast Care: Breastfeeding: breast milk to nipples, lanolin to nipples  Breastfeeding Assistance: feeding cue recognition promoted, feeding on demand promoted, infant latch-on verified, support offered  Breast Care: Breastfeeding: breast milk to nipples, lanolin to nipples  Breastfeeding Assistance: feeding cue recognition promoted, feeding on demand promoted, infant latch-on verified, support offered  Breastfeeding Support: diary/feeding log utilized,  encouragement provided, lactation counseling provided, maternal hydration promoted, maternal nutrition promoted, maternal rest encouraged       Breastfeeding Session    Infant Positioning: cross-cradle  Signs of Milk Transfer: infant jaw motion present(per mom;just finished BR)    Maternal Information

## 2018-12-30 ENCOUNTER — TELEPHONE (OUTPATIENT)
Dept: OBSTETRICS AND GYNECOLOGY | Facility: HOSPITAL | Age: 33
End: 2018-12-30

## 2018-12-30 VITALS
DIASTOLIC BLOOD PRESSURE: 92 MMHG | WEIGHT: 200 LBS | SYSTOLIC BLOOD PRESSURE: 149 MMHG | OXYGEN SATURATION: 99 % | BODY MASS INDEX: 36.8 KG/M2 | TEMPERATURE: 98 F | HEIGHT: 62 IN | RESPIRATION RATE: 18 BRPM | HEART RATE: 89 BPM

## 2018-12-30 PROCEDURE — 25000003 PHARM REV CODE 250: Performed by: OBSTETRICS & GYNECOLOGY

## 2018-12-30 RX ORDER — HYDRALAZINE HYDROCHLORIDE 25 MG/1
25 TABLET, FILM COATED ORAL ONCE
Status: COMPLETED | OUTPATIENT
Start: 2018-12-30 | End: 2018-12-30

## 2018-12-30 RX ORDER — LABETALOL 200 MG/1
200 TABLET, FILM COATED ORAL EVERY 12 HOURS
Qty: 30 TABLET | Refills: 1 | Status: SHIPPED | OUTPATIENT
Start: 2018-12-30 | End: 2019-12-30

## 2018-12-30 RX ORDER — LABETALOL 200 MG/1
200 TABLET, FILM COATED ORAL ONCE
Status: COMPLETED | OUTPATIENT
Start: 2018-12-30 | End: 2018-12-30

## 2018-12-30 RX ADMIN — IBUPROFEN 600 MG: 600 TABLET, FILM COATED ORAL at 02:12

## 2018-12-30 RX ADMIN — OXYCODONE HYDROCHLORIDE AND ACETAMINOPHEN 1 TABLET: 10; 325 TABLET ORAL at 05:12

## 2018-12-30 RX ADMIN — OXYCODONE HYDROCHLORIDE AND ACETAMINOPHEN 1 TABLET: 10; 325 TABLET ORAL at 10:12

## 2018-12-30 RX ADMIN — LABETALOL HYDROCHLORIDE 200 MG: 200 TABLET, FILM COATED ORAL at 09:12

## 2018-12-30 RX ADMIN — NIFEDIPINE 30 MG: 30 TABLET, FILM COATED, EXTENDED RELEASE ORAL at 08:12

## 2018-12-30 RX ADMIN — OXYCODONE HYDROCHLORIDE AND ACETAMINOPHEN 1 TABLET: 10; 325 TABLET ORAL at 02:12

## 2018-12-30 RX ADMIN — DOCUSATE SODIUM 200 MG: 100 CAPSULE, LIQUID FILLED ORAL at 08:12

## 2018-12-30 RX ADMIN — HYDRALAZINE HYDROCHLORIDE 25 MG: 25 TABLET, FILM COATED ORAL at 04:12

## 2018-12-30 NOTE — PLAN OF CARE
0430 Dr. Wiedemann notified of BP at 0200 159/92 and BP at 0425 154/93.  Order received for one time dose of Hydralazine 25mg po now.

## 2018-12-30 NOTE — DISCHARGE INSTRUCTIONS
Breastfeeding Discharge Instructions       Feed the baby at the earliest sign of hunger or comfort  o Hands to mouth, sucking motions  o Rooting or searching for something to suck on  o Dont wait for crying - it is a sign of distress     The feedings may be 8-12 times per 24hrs and will not follow a schedule   Avoid pacifiers and bottles for the first 4 weeks   Alternate the breast you start the feeding with, or start with the breast that feels the fullest   Switch breasts when the baby takes himself off the breast or falls asleep   Keep offering breasts until the baby looks full, no longer gives hunger signs, and stays asleep when placed on his back in the crib   If the baby is sleepy and wont wake for a feeding, put the baby skin-to-skin dressed in a diaper against the mothers bare chest   Sleep near your baby   The baby should be positioned and latched on to the breast correctly  o Chest-to-chest, chin in the breast  o Babys lips are flipped outward  o Babys mouth is stretched open wide like a shout  o Babys sucking should feel like tugging to the mother  - The baby should be drinking at the breast:  o You should hear swallowing or gulping throughout the feeding  o You should see milk on the babys lips when he comes off the breast  o Your breasts should be softer when the baby is finished feeding  o The baby should look relaxed at the end of feedings  o After the 4th day and your milk is in:  o The babys poop should turn bright yellow and be loose, watery, and seedy  o The baby should have at least 3-4 poops the size of the palm of your hand per day  o The baby should have at least 5-6 wet diapers per day  o The urine should be light yellow in color  You should drink when you are thirsty and eat a healthy diet when you are    hungry.     Take naps to get the rest you need.   Take medications and/or drink alcohol only with permission of your obstetrician    or the babys pediatrician.  You can  also call the [facility],   ([phone]), Monday-Friday, 8am-5pm Central time, to get the most   up-to-date evidence-based information on the use of medications during   pregnancy and breastfeeding.      The baby should be examined by a pediatrician at 3-5 days of age.  Once your   milk comes in, the baby should be gaining at least ½ - 1oz each day and should be back to birthweight no later than 10-14 days of age.          Community Resources    [facility] Breastfeeding Warmline: [phone]  Local [facility] clinics: provide incentives and breastpumps to eligible mothers  [employer] (LLLI):  mother-to-mother support group website        www.Anb[employer] Video.sougou  Local La Leche League mother-to-mother support groups:        www.Advisity        La Leche League [facility]   Dr. Juan Finn website for latch videos and general information:        www.breastfeedinginc.ca  [facility] is a call center that provides information about the safety of taking medications while breastfeeding.  Call [phone], M-F, 8am-5pm, CT.  International Lactation Consultant Association provides resources for assistance:        www.ilca.org  [facility] Breastfeeding Coalition provides informationand resources for parents  and the community    http://[facility] [place]feeding.org     Osiris Gutierrez is a mom-to-mom support group:                             www.nolanesting.DEONTICS//breastfeedng-support/  Partners for Healthy Babies:  8-460-043-BABY(9423)  [employer]: a breastfeeding support group for women of color, 489.705.9200    Patient Discharge Instructions for Postpartum Women    Resume Regular Diet  Increase activity gradually, no heavy lifting  Shower  No tampons, douching or sexual intercourse.  Discuss birth control options with your physician.  Wear a support bra  Return to work/school when you've been cleared by a physician    Call your physician if     *Fever of 100.4 or higher  *Persistent  "nausea/ vomiting  *Incisional drainage  *Heavy vaginal bleeding or large clots (Heavy bleeding is soaking 1 pad in an hour)  *Swelling and pain in arms or legs  *Severe headaches, blurred vision or fainting  *Shortness of breath  *Frequency and burning with urination  *Signs of postpartum depression, discuss these signs with your physician    Call lactation services for questions regarding feeding, nipple and breast care, and general questions about lactation.  They can be reached at [phone]         Understanding Postpartum Depression    You've just had a baby.  You know you should be excited and happy.  But instead you find yourself crying for no reason.  You may have trouble coping with your daily tasks.  You feel sad, tired, and hopeless most of the time.  You may even feel ashamed or guilty.  But what you're going through is not your fault and you can feel better.  Talk to your doctor.  He or she can help.    Depression After Childbirth    You may be weepy and tired right after giving birth.  These feelings are normal.  They're sometimes called the "baby blues."  These blues go away 2-3 weeks.  However, postpartum (meaning "after birth") depression lasts much longer and is more sever than the "baby blues."  It can make you feel sad and hopeless.  You may also fear that your baby will be harmed and worry about being a bad mother.      What is Depression?    Depression is a mood disorder that affects the way you think and feel.  The most common symptom is a feeling of deep sadness.  You may also feel as if you just can't cope with life.    Other symptoms include:      * Gaining or loosing weight  * Sleeping too much or too little  * Feeling tired all the time  * Feeling restless  * Fears of harming your baby   * Lack of interest in your baby  * Feeling worthless or guilty  * No longer finding pleasure in things you used to  * Having trouble thinking clearly or making decisions  * Thoughts of hurting yourself " or your baby    What Causes Postpartum Depression    The exact causes of postpartum depression isn't known.  It may be due to changes in your hormones during and after childbirth.  You may also be tired from caring for your baby and adjusting to being a mother.  All these factors may make you feel depressed.  In some cases, your genes may also play a role.    Depression Can Be Treated    The good news is that there are many ways to treat postpartum depression.  Talking to your doctor is the first step toward feeling better.    Resources:    * National [place] of Mental Health  -- 416-505-0260    www.nimh.nih.gov    * National [place] on Mental Illness --365.634.4144    Www.branden.org    * Mental Health Melanie -- 416.588.2537     Www.nmha.org    * National Suicide Hotline --279.566.2991 (800-SUICIDE)    3036-8812 [employer]  All rights reserved.  This information is not intended as a substitute for professional medical care.  Always follow up with your healthcare professional's instructions.

## 2018-12-30 NOTE — PLAN OF CARE
0800 /96 HR 80 scheduled procardia given    0915 /104  paged Dr. Wiedemann, verbal orders with read back for Labetalol 200 mg PO one time for BP given.     1130 / 92 HR 89     1345 Pt remains asymptomatic per shift. No complaints at this time. Reviewed discharge documentation and medications with patient. Pt verbalized f/u appt is to be scheduled w/ OB this week. Pt is bonding with baby. Smiles appropriately at baby. Family support noted at bedside.  Mother shows she is able to care for herself and baby. Patient reports having help at home. Pt transported via wheelchair with baby in arms for discharge.

## 2018-12-30 NOTE — PROGRESS NOTES
Post op day 3  Afebrile, osito diet, voiding, passing flatus  Feels good, no headaches  bp has been borderline, not severe  Did have single dose oral hydralazine this am  Discussed prob dc later today if cont to do well and bp stable  Common for bp to be elevated for weeks after pre-e, but should not  Be severe  Will cont obs for now

## 2019-01-03 ENCOUNTER — TELEPHONE (OUTPATIENT)
Dept: OBSTETRICS AND GYNECOLOGY | Facility: CLINIC | Age: 34
End: 2019-01-03

## 2019-01-03 NOTE — DISCHARGE SUMMARY
Pt is now post op day # 3 from .  VSS are stable, no fever, she is tolerating a regular diet, voiding well, ambulating and passing flatus.  Her oral pain meds are adequate for pain control.   Exam; breathing normally, abdomen is soft, bowel sounds normal, uterus firm and below the umbilicus.  Minimal surgical tenderness only. The incision is healing well, intact without any erythema or drainage. Her extremities are normal with no abnormal color or edema.   Ass; clinically doing well   Plan, In light of her good clinical status, she is being discharge to home today in good condion, on a regular diet.  She is to have limited activity with light ambulation and shower for one week.  Normal care for herself and the baby is fine, with no heavy lifting.   Oral pain medicines have been prescribed for home use.  I discussed discharge along  With the limited activity and when to return for a follow-up visit.  I also discussed signs and symptoms to be worried about such as heavy bleeding, fever, drainage from the incision, weakness, and increasing pain, along with others.   There were no complications during her hospital stay.     Pt was given antihypertensives for elevated pt  Instructed to fu within a week for bp eval

## 2019-01-03 NOTE — TELEPHONE ENCOUNTER
----- Message from Dayna August sent at 1/3/2019  4:14 PM CST -----  Contact: [phone]/kjpe  Pt is returning your call

## 2019-01-03 NOTE — TELEPHONE ENCOUNTER
----- Message from Poonam Kidd sent at 1/3/2019  3:41 PM CST -----  Contact: [phone]/ self   Patient requesting to speak with you regarding being seen sooner than next available for postpartum appt in [place] as she had baby on ; . Pt has appt in [place] but won't be able to make it due to being unable to drive. Please call to further discuss and advise.

## 2019-01-07 ENCOUNTER — ROUTINE PRENATAL (OUTPATIENT)
Dept: OBSTETRICS AND GYNECOLOGY | Facility: CLINIC | Age: 34
End: 2019-01-07
Payer: COMMERCIAL

## 2019-01-07 VITALS — BODY MASS INDEX: 31.46 KG/M2 | WEIGHT: 172 LBS | DIASTOLIC BLOOD PRESSURE: 92 MMHG | SYSTOLIC BLOOD PRESSURE: 142 MMHG

## 2019-01-07 PROCEDURE — 99212 PR OFFICE/OUTPT VISIT, EST, LEVL II, 10-19 MIN: ICD-10-PCS | Mod: S$GLB,,, | Performed by: OBSTETRICS & GYNECOLOGY

## 2019-01-07 PROCEDURE — 99999 PR PBB SHADOW E&M-EST. PATIENT-LVL II: ICD-10-PCS | Mod: PBBFAC,,, | Performed by: OBSTETRICS & GYNECOLOGY

## 2019-01-07 PROCEDURE — 99212 OFFICE O/P EST SF 10 MIN: CPT | Mod: S$GLB,,, | Performed by: OBSTETRICS & GYNECOLOGY

## 2019-01-07 PROCEDURE — 3008F BODY MASS INDEX DOCD: CPT | Mod: CPTII,S$GLB,, | Performed by: OBSTETRICS & GYNECOLOGY

## 2019-01-07 PROCEDURE — 3008F PR BODY MASS INDEX (BMI) DOCUMENTED: ICD-10-PCS | Mod: CPTII,S$GLB,, | Performed by: OBSTETRICS & GYNECOLOGY

## 2019-01-07 PROCEDURE — 99999 PR PBB SHADOW E&M-EST. PATIENT-LVL II: CPT | Mod: PBBFAC,,, | Performed by: OBSTETRICS & GYNECOLOGY

## 2019-02-11 ENCOUNTER — POSTPARTUM VISIT (OUTPATIENT)
Dept: OBSTETRICS AND GYNECOLOGY | Facility: CLINIC | Age: 34
End: 2019-02-11
Payer: COMMERCIAL

## 2019-02-11 VITALS — WEIGHT: 160 LBS | DIASTOLIC BLOOD PRESSURE: 92 MMHG | SYSTOLIC BLOOD PRESSURE: 132 MMHG | BODY MASS INDEX: 29.26 KG/M2

## 2019-02-11 PROCEDURE — 99999 PR PBB SHADOW E&M-EST. PATIENT-LVL III: ICD-10-PCS | Mod: PBBFAC,,, | Performed by: OBSTETRICS & GYNECOLOGY

## 2019-02-11 PROCEDURE — 99999 PR PBB SHADOW E&M-EST. PATIENT-LVL III: CPT | Mod: PBBFAC,,, | Performed by: OBSTETRICS & GYNECOLOGY

## 2019-02-11 NOTE — PROGRESS NOTES
CC: Post-partum follow-up    Michael Ashley is a 33 y.o. female  presents for post-partum visit s/p a , due to fetal macrosomia.    Delivery Date: 2018  Delivery MD: Arianna  Gender: male   Name:   Birth Weight: 8 pounds 0 ounces  Breast Feeding: YES  Depression: NO  Contraception: no method    Pregnancy was complicated by:  Pre E    Past Medical History:   Diagnosis Date    Hypertension     Patient denies medical problems      Past Surgical History:   Procedure Laterality Date    DELIVERY-CEASAREAN SECTION N/A 2018    Performed by [facility]iff[facility] at [facility] L[facility]D    WISDOM TOOTH EXTRACTION       Social History     Tobacco Use    Smoking status: Never Smoker    Smokeless tobacco: Never Used   Substance Use Topics    Alcohol use: No    Drug use: No     Family History   Problem Relation Age of Onset    Breast cancer Paternal Aunt     Arrhythmia Neg Hx     Cardiomyopathy Neg Hx     Congenital heart disease Neg Hx     Early death Neg Hx     Heart attacks under age 50 Neg Hx     Pacemaker/defibrilator Neg Hx      OB History    Para Term  AB Living   1 1 1 0 0 1   SAB TAB Ectopic Multiple Live Births   0 0 0 0 1      # Outcome Date GA Lbr Asif/2nd Weight Sex Delivery Anes PTL Lv   1 Term 18 37w2d  3.629 kg (8 lb) M CS-LTranv Spinal N MELECIO          Current Outpatient Medications:     folic acid (FOLVITE) 800 MCG Tab, Take 800 mcg by mouth once daily., Disp: , Rfl:     ibuprofen (ADVIL,MOTRIN) 600 MG tablet, Take 1 tablet (600 mg total) by mouth every 6 (six) hours., Disp: 60 tablet, Rfl: 0    labetalol (NORMODYNE) 200 MG tablet, Take 1 tablet (200 mg total) by mouth every 12 (twelve) hours., Disp: 30 tablet, Rfl: 1    NIFEdipine (PROCARDIA-XL) 30 MG (OSM) 24 hr tablet, Take 1 tablet (30 mg total) by mouth once daily., Disp: 30 tablet, Rfl: 1     BP (!) 132/92   Wt 72.6 kg (160 lb)   Breastfeeding? Yes   BMI 29.26 kg/m²     ROS:  GENERAL: No fever,  chills, fatigability or weight loss.  VULVAR: No pain, no lesions and no itching.  VAGINAL: No relaxation, no itching, no discharge, no abnormal bleeding and no lesions.  ABDOMEN: No abdominal pain. Denies nausea. Denies vomiting. No diarrhea. No constipation  BREAST: Denies pain. No lumps. No discharge.  URINARY: No incontinence, no nocturia, no frequency and no dysuria.  CARDIOVASCULAR: No chest pain. No shortness of breath. No leg cramps.  NEUROLOGICAL: No headaches. No vision changes.    PE:   APPEARANCE: Well nourished, well developed, in no acute distress.  NECK: Neck symmetric without  thyromegaly.  CHEST: Lungs clear to auscultation.  HEART: Regular rate and rhythm, no murmurs, rubs or gallops.  ABDOMEN: Soft. No tenderness or masses. No hernias.    EXTREMITIES: No edema.      ASSESSMENT:  1. Postpartum Exam    PLAN:  RTO 5/19 for annual     cont Procardia, see PCP for med mgmt for CHTN      Patient was counseled today on A.C.S. Pap guidelines and recommendations for yearly pelvic exams and monthly self breast exams; to see her PCP for other health maintenance and contraception options.

## 2019-02-26 RX ORDER — NIFEDIPINE 30 MG/1
TABLET, EXTENDED RELEASE ORAL
Qty: 30 TABLET | Refills: 0 | OUTPATIENT
Start: 2019-02-26

## 2020-02-20 NOTE — ANESTHESIA PREPROCEDURE EVALUATION
2018  Michael Ashley is a 33 y.o., female  admitted for gestational hypertension and LE swelling plan for  under spinal anesthesia.    Past Medical History:   Diagnosis Date    Patient denies medical problems      Past Surgical History:   Procedure Laterality Date    WISDOM TOOTH EXTRACTION         Anesthesia Evaluation    I have reviewed the Patient Summary Reports.    I have reviewed the Nursing Notes.   I have reviewed the Medications.     Review of Systems  Anesthesia Hx:  No problems with previous Anesthesia  Denies Family Hx of Anesthesia complications.   Denies Personal Hx of Anesthesia complications.   Social:  Non-Smoker, No Alcohol Use    Hematology/Oncology:     Oncology Normal    -- Anemia:   EENT/Dental:EENT/Dental Normal   Cardiovascular:   Hypertension    Pulmonary:  Pulmonary Normal    Renal/:  Renal/ Normal     Hepatic/GI:  Hepatic/GI Normal    Musculoskeletal:  Musculoskeletal Normal    Neurological:  Neurology Normal    Endocrine:  Endocrine Normal    Dermatological:  Skin Normal    Psych:  Psychiatric Normal         Recent Labs   Lab 18  1237   WBC 9.09   RBC 4.02   HGB 11.0*   HCT 34.0*      MCV 85   MCH 27.4   MCHC 32.4       Chemistry        Component Value Date/Time     (L) 2018 1237    K 4.0 2018 1237     2018 1237    CO2 21 (L) 2018 1237    BUN 11 2018 1237    CREATININE 0.7 2018 1237    GLU 79 2018 1237        Component Value Date/Time    CALCIUM 8.8 2018 1237    ALKPHOS 168 (H) 2018 1237    AST 25 2018 1237    ALT 15 2018 1237    BILITOT 0.3 2018 1237    ESTGFRAFRICA >60 2018 1237    EGFRNONAA >60 2018 1237              Physical Exam  General:  Well nourished    Airway/Jaw/Neck:  Airway Findings: Mouth Opening: Normal Tongue: Normal  General Airway  MD Notification    Notified Person: MD    Notified Person Name: Jordan    Notification Date/Time: 17:54     Notification Interaction: paged    Purpose of Notification: is plan for transfer to the [facility]?     Orders Received:    Comments:     Assessment: Adult  Mallampati: II  TM Distance: Normal, at least 6 cm  Jaw/Neck Findings:  Neck ROM: Normal ROM      Dental:  Dental Findings: In tact   Chest/Lungs:  Chest/Lungs Findings: Clear to auscultation, Normal Respiratory Rate     Heart/Vascular:  Heart Findings: Rate: Normal  Rhythm: Regular Rhythm     Abdomen:  Abdomen Findings:  Gravid uterus     Mental Status:  Mental Status Findings:  Cooperative, Alert and Oriented         Anesthesia Plan  Type of Anesthesia, risks & benefits discussed:  Anesthesia Type:  CSE, epidural, MAC, general, spinal  Patient's Preference:   Intra-op Monitoring Plan:   Intra-op Monitoring Plan Comments:   Post Op Pain Control Plan:   Post Op Pain Control Plan Comments:   Induction:    Beta Blocker:  Patient is not currently on a Beta-Blocker (No further documentation required).       Informed Consent: Patient understands risks and agrees with Anesthesia plan.  Questions answered. Anesthesia consent signed with patient.  ASA Score: 2  emergent   Day of Surgery Review of History & Physical: I have interviewed and examined the patient. I have reviewed the patient's H&P dated:    H&P update referred to the surgeon.         Ready For Surgery From Anesthesia Perspective.

## (undated) DEVICE — DRESSING AQUACEL AG ADV 3.5X12